# Patient Record
Sex: FEMALE | Race: WHITE | Employment: FULL TIME | URBAN - METROPOLITAN AREA
[De-identification: names, ages, dates, MRNs, and addresses within clinical notes are randomized per-mention and may not be internally consistent; named-entity substitution may affect disease eponyms.]

---

## 2017-06-23 ENCOUNTER — HOSPITAL ENCOUNTER (OUTPATIENT)
Dept: LAB | Age: 32
Discharge: HOME | End: 2017-06-23

## 2017-06-23 ENCOUNTER — OFFICE VISIT (OUTPATIENT)
Dept: SURGERY | Age: 32
End: 2017-06-23

## 2017-06-23 VITALS
BODY MASS INDEX: 24.92 KG/M2 | RESPIRATION RATE: 20 BRPM | HEART RATE: 64 BPM | WEIGHT: 146 LBS | TEMPERATURE: 96.6 F | HEIGHT: 64 IN | DIASTOLIC BLOOD PRESSURE: 86 MMHG | OXYGEN SATURATION: 98 % | SYSTOLIC BLOOD PRESSURE: 128 MMHG

## 2017-06-23 DIAGNOSIS — R22.31 AXILLARY MASS, RIGHT: Primary | ICD-10-CM

## 2017-06-23 PROCEDURE — 88185 FLOWCYTOMETRY/TC ADD-ON: CPT | Performed by: SURGERY

## 2017-06-23 PROCEDURE — 88342 IMHCHEM/IMCYTCHM 1ST ANTB: CPT | Performed by: SURGERY

## 2017-06-23 PROCEDURE — 88312 SPECIAL STAINS GROUP 1: CPT | Performed by: SURGERY

## 2017-06-23 PROCEDURE — 88184 FLOWCYTOMETRY/ TC 1 MARKER: CPT | Performed by: SURGERY

## 2017-06-23 PROCEDURE — 88360 TUMOR IMMUNOHISTOCHEM/MANUAL: CPT | Performed by: SURGERY

## 2017-06-23 PROCEDURE — 88365 INSITU HYBRIDIZATION (FISH): CPT | Performed by: SURGERY

## 2017-06-23 PROCEDURE — 88305 TISSUE EXAM BY PATHOLOGIST: CPT | Performed by: SURGERY

## 2017-06-23 NOTE — MR AVS SNAPSHOT
Visit Information Date & Time Provider Department Dept. Phone Encounter #  
 6/23/2017 11:20 AM Agnes Shoemaker MD [facility] [address] [facility] [phone] [id number] Your Appointments 6/28/2017  1:00 PM  
New Patient with Thee Wolef MD  
[address] ([facility]) Appt Note: NP golf ball size mass under rt arm referring Dr Middleton Side will have all office notes faxed [address] [facility] [address] [facility] [phone]  
[facility] [address] [phone]  
  
    
 7/10/2017 11:40 AM  
ESTABLISHED PATIENT with Agnes Shoemaker MD  
[facility] [address] Anuel Gamboa ([facility]) Appt Note: [address], [address] [facility], [address] [phone]  
[address] [facility], [address] [facility] [address] [phone] Upcoming Health Maintenance Date Due DTaP/Tdap/Td series (1 - Tdap) 7/30/2008 PAP AKA CERVICAL CYTOLOGY 7/30/2008 INFLUENZA AGE 9 TO ADULT 8/1/2017 Allergies as of 6/23/2017  Review Complete On: 6/23/2017 By: Agnes Shoemaker MD  
  
 Severity Noted Reaction Type Reactions Amoxicillin  09/15/2015    Rash, Swelling Sulfa (Sulfonamide Antibiotics)  09/15/2015    Rash, Swelling Current Immunizations  Never Reviewed No immunizations on file. Not reviewed this visit You Were Diagnosed With   
  
 Codes Comments Axillary mass, right    -  Primary ICD-10-CM: R22.31 
ICD-9-CM: 782. 2 Vitals BP Pulse Temp Resp Height(growth percentile) Weight(growth percentile) 128/86 (BP 1 Location: Right arm, BP Patient Position: Sitting) 64 96.6 °F (35.9 °C) (Oral) 20 5' 4\" (1.626 m) 146 lb (66.2 kg) LMP SpO2 BMI OB Status Smoking Status 06/09/2017 98% 25.06 kg/m2 Having regular periods Never Smoker Vitals History BMI and BSA Data Body Mass Index Body Surface Area 25.06 kg/m 2 1.73 m 2 Preferred Pharmacy Pharmacy Name Phone [facility] [address], [address] [phone] Your Updated Medication List  
  
   
This list is accurate as of: 6/23/17  1:31 PM.  Always use your most recent med list.  
  
  
  
  
 aspirin, buffered 81 mg Tab Take  by mouth.  
  
 triamcinolone acetonide 0.1 % ointment Commonly known as:  KENALOG Apply  to affected area two (2) times a day. use thin layer We Performed the Following CBC WITH AUTOMATED DIFF [85152 CPT(R)] Introducing [facility]! Meggan Peterson introduces [employer] patient portal. Now you can access parts of your medical record, email your doctor's office, and request medication refills online. 1. In your internet browser, go to [email or web address] 2. Click on the First Time User? Click Here link in the Sign In box. You will see the New Member Sign Up page. 3. Enter your [employer] Access Code exactly as it appears below. You will not need to use this code after youve completed the sign-up process. If you do not sign up before the expiration date, you must request a new code. · [employer] Access Code: [email or web address] Expires: 9/21/2017  1:30 PM 
 
4. Enter the last four digits of your Social Security Number (xxxx) and Date of Birth (mm/dd/yyyy) as indicated and click Submit. You will be taken to the next sign-up page. 5. Create a HomeSpacet ID. This will be your [employer] login ID and cannot be changed, so think of one that is secure and easy to remember. 6. Create a [employer] password. You can change your password at any time. 7. Enter your Password Reset Question and Answer. This can be used at a later time if you forget your password. 8. Enter your e-mail address. You will receive e-mail notification when new information is available in [address] 9. Click Sign Up. You can now view and download portions of your medical record. 10. Click the Download Summary menu link to download a portable copy of your medical information. If you have questions, please visit the Frequently Asked Questions section of the Ark[employer] website. Remember, [employer] is NOT to be used for urgent needs. For medical emergencies, dial 911. Now available from your iPhone and Android! Please provide this summary of care documentation to your next provider. Your primary care clinician is listed as [address]. If you have any questions after today's visit, please call [phone].

## 2017-06-23 NOTE — PROGRESS NOTES
Limited Ultrasound of the breast/axilla    Procedure:  Ultrasound of the right axilla  Indication:  Right axillary mass at    Surgeon:  Summer Richardson MD FACS  Procedure:  Utilizing a [employer] Nemio 20 high frequency linear array transducer the right axilla and axillary tail of the breast was scanned and images obtained  Findings:  Multiple enlarged lymph nodes with the largest measuring 2.7 x 1.8 x 3.1 cm hypoechoic with central enlarged hilum  Impression:  Enlarged lymph nodes suspicious for malignancy  Recommend:  Biopsy for histology    Summer Richardson MD FACS

## 2017-06-23 NOTE — PROGRESS NOTES
[facility] - [address] [address]  OFFICE PROCEDURE PROGRESS NOTE        Chart reviewed for the following:   Bonny Pedraza LPN, have reviewed the History, Physical and updated the Allergic reactions for [address] performed immediately prior to start of procedure:   Bonny Pedraza LPN, have performed the following reviews on [employer] prior to the start of the procedure:            * Patient was identified by name and date of birth   * Agreement on procedure being performed was verified  * Risks and Benefits explained to the patient  * Procedure site verified and marked as necessary  * Patient was positioned for comfort  * Consent was signed and verified     Time: 1:00      Date of procedure: 6/23/2017    Procedure performed by:  Soheila Anderson MD    Provider assisted by: Crystal Maldonado LPN    Patient assisted by: spouse    How tolerated by patient: tolerated the procedure well with no complications    Post Procedural Pain Scale: 0 - No Hurt    Comments: none

## 2017-06-23 NOTE — PROGRESS NOTES
HISTORY OF PRESENT ILLNESS  Malika Bennett is a 36 y.o. female who comes in for consultation by Kim White MD for a lump under her arm  HPI  She noted a right axillary mass about two weeks ago. She is unsure if it could have been there longer. She trauma, skin changes, breast lumps, nipple changes or drainage, unexplained weight loss, fatigue, fever, chills or sweats. 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 She had menarche at 14. Her PGM had breast cancer. Past Medical History:   Diagnosis Date    Anticardiolipin antibody syndrome ([facility] [address]     no birth control, takes ASA daily     . History reviewed. No pertinent surgical history. Family History   Problem Relation Age of Onset    Cancer Father      skin     Social History   Substance Use Topics    Smoking status: Never Smoker    Smokeless tobacco: Never Used    Alcohol use 0.0 oz/week     0 Standard drinks or equivalent per week      Comment: 3x/week 1-2 drinks     Current Outpatient Prescriptions   Medication Sig    Aspirin, Buffered 81 mg tab Take  by mouth.  triamcinolone acetonide (KENALOG) 0.1 % ointment Apply  to affected area two (2) times a day. use thin layer     No current facility-administered medications for this visit. Allergies   Allergen Reactions    Amoxicillin Rash and Swelling    Sulfa (Sulfonamide Antibiotics) Rash and Swelling       Review of Systems   Constitutional: Negative for chills, diaphoresis, fever, malaise/fatigue and weight loss. HENT: Negative for congestion, ear pain and sore throat. Eyes: Negative for blurred vision and pain. Respiratory: Negative for cough, hemoptysis, sputum production, shortness of breath, wheezing and stridor. Cardiovascular: Negative for chest pain, palpitations, orthopnea, claudication, leg swelling and PND. Gastrointestinal: Negative for abdominal pain, blood in stool, constipation, diarrhea, heartburn, melena, nausea and vomiting. Genitourinary: Negative for dysuria, flank pain, frequency, hematuria and urgency. Musculoskeletal: Negative for back pain, joint pain, myalgias and neck pain. Skin: Negative for itching and rash. Neurological: Negative for dizziness, tremors, focal weakness, seizures, weakness and headaches. Endo/Heme/Allergies: Negative for polydipsia. Psychiatric/Behavioral: Negative for depression and memory loss. The patient is not nervous/anxious. Visit Vitals    /86 (BP 1 Location: Right arm, BP Patient Position: Sitting)    Pulse 64    Temp 96.6 °F (35.9 °C) (Oral)    Resp 20    Ht 5' 4\" (1.626 m)    Wt 66.2 kg (146 lb)    LMP 06/09/2017    SpO2 98%    BMI 25.06 kg/m2       Physical Exam   Constitutional: She is oriented to person, place, and time. She appears well-developed and well-nourished. No distress. HENT:   Head: Normocephalic and atraumatic. Mouth/Throat: Oropharynx is clear and moist. No oropharyngeal exudate. Eyes: Conjunctivae and EOM are normal. Pupils are equal, round, and reactive to light. No scleral icterus. Neck: Normal range of motion. Neck supple. No JVD present. No tracheal deviation present. No thyromegaly present. Cardiovascular: Normal rate and regular rhythm. Exam reveals no gallop and no friction rub. No murmur heard. Pulmonary/Chest: Effort normal and breath sounds normal. No respiratory distress. She has no wheezes. She has no rales. Abdominal: Soft. Bowel sounds are normal. She exhibits no distension and no mass. There is no tenderness. There is no rebound and no guarding. Musculoskeletal: Normal range of motion. She exhibits no edema. Lymphadenopathy:     She has no cervical adenopathy. She has axillary adenopathy. Right axillary: Pectoral (3 cm mobile axillary mass) adenopathy present. Left axillary: No pectoral and no lateral adenopathy present. Right: No inguinal and no supraclavicular adenopathy present. Left: No inguinal and no supraclavicular adenopathy present. Neurological: She is alert and oriented to person, place, and time. No cranial nerve deficit. Skin: Skin is warm and dry. No rash noted. She is not diaphoretic. No erythema. No pallor. Psychiatric: She has a normal mood and affect. Her behavior is normal. Judgment and thought content normal.       ASSESSMENT and PLAN  1. Right axillary mass ? ?lymph node. I explained to her about the anatomy and pathophysiology of the process and options for observation vs further work up and biopsy. She desires further work up.   We did an US and core biopsy in the office today  Send for path and flow cytometry  Check CBC with diff  RTC 1-2 weeks  Everet Riedel, MD FACS

## 2017-06-23 NOTE — PROGRESS NOTES
Procedure Note    Pre Procedure Diagnosis:  Right axillary mass/lymphadenopathy  Post Procedure Diagnosis:  Right axillary mass/lymphadenopathy  Procedure:  1. Ultrasound guided core biopsy of right axillary mass/LN                        Surgeon:   Josh Law MD FACS  Local 10 ml 1% lidocaine with epi  EBL minimal  SPECIMEN:   Right axillary mass/lymph node    Procedure:  After informed consent the right axilla was prepped with betadine. After a time out, using ultrasound guidance, local anesthesia was injected into the dermis and subcutaneous tissues adjacent to the mass noted on ultrasound. A small stab incision was made and using a 14 gauge Achieve core biopsy device and ultrasound guidance 4 cores were taken from a inferior to superior approach. Pictures were taken to document this procedure. Some tissue was placed in RPMI for flow cytometry. Pressure was held and then a sterile dressing was applied. The patient tolerated the procedure well.       Signed  Josh Law MD FACS

## 2017-06-24 LAB
BASOPHILS # BLD AUTO: 0 X10E3/UL (ref 0–0.2)
BASOPHILS NFR BLD AUTO: 0 %
EOSINOPHIL # BLD AUTO: 0.1 X10E3/UL (ref 0–0.4)
EOSINOPHIL NFR BLD AUTO: 1 %
ERYTHROCYTE [DISTWIDTH] IN BLOOD BY AUTOMATED COUNT: 15.5 % (ref 12.3–15.4)
HCT VFR BLD AUTO: 39.3 % (ref 34–46.6)
HGB BLD-MCNC: 12.7 G/DL (ref 11.1–15.9)
IMM GRANULOCYTES # BLD: 0 X10E3/UL (ref 0–0.1)
IMM GRANULOCYTES NFR BLD: 0 %
LYMPHOCYTES # BLD AUTO: 2.2 X10E3/UL (ref 0.7–3.1)
LYMPHOCYTES NFR BLD AUTO: 26 %
MCH RBC QN AUTO: 26.2 PG (ref 26.6–33)
MCHC RBC AUTO-ENTMCNC: 32.3 G/DL (ref 31.5–35.7)
MCV RBC AUTO: 81 FL (ref 79–97)
MONOCYTES # BLD AUTO: 0.7 X10E3/UL (ref 0.1–0.9)
MONOCYTES NFR BLD AUTO: 9 %
NEUTROPHILS # BLD AUTO: 5.5 X10E3/UL (ref 1.4–7)
NEUTROPHILS NFR BLD AUTO: 64 %
PLATELET # BLD AUTO: 289 X10E3/UL (ref 150–379)
RBC # BLD AUTO: 4.85 X10E6/UL (ref 3.77–5.28)
WBC # BLD AUTO: 8.6 X10E3/UL (ref 3.4–10.8)

## 2017-06-28 ENCOUNTER — TELEPHONE (OUTPATIENT)
Dept: SURGERY | Age: 32
End: 2017-06-28

## 2017-06-28 NOTE — TELEPHONE ENCOUNTER
FINAL PATHOLOGIC DIAGNOSIS   Right axillary mass, needle core biopsy:   Mixed B and T lymphocytes with focal follicular hyperplasia. See comment. Comment   The needle core biopsy reveals numerous small lymphocytes with focal follicular hyperplasia. Scattered immunoblasts are seen but no convincing HRS or LP cells are identified. CD20 and PAX5 immunohistochemistry stains B-cell areas including germinal centers. The germinal centers are highlighted by CD10, BCL6 and Ki-67 without coexpression of BCL2. GZ43 reveals follicular dendritic cell meshwork. CD3, CD5 and CD43 stain T cell areas. Scattered MUM1 positive lymphocytes are present.  stains scattered plasma cells. The plasma cells are polytypic for kappa and lambda by in situ hybridization. CD30, CD15 and MARIA GUADALUPE are pending. Flow cytometric analysis was performed and interpreted at [facility] and reportedly reveals   Relative B-cell predominance without evidence of monoclonality detected; T cells with no loss of T-cell antigens by Brianna Knapp M.D. In summary, there is no evidence of lymphoma in this needle core biopsy. Incisional/excisional biopsy of the lymph node may be considered if clinical concern persists.      Spoke with the patient      RTC 2-3 weeks      Inessa Fine MD FACS

## 2017-07-10 ENCOUNTER — OFFICE VISIT (OUTPATIENT)
Dept: SURGERY | Age: 32
End: 2017-07-10

## 2017-07-10 VITALS
WEIGHT: 146 LBS | HEART RATE: 65 BPM | OXYGEN SATURATION: 100 % | HEIGHT: 64 IN | SYSTOLIC BLOOD PRESSURE: 133 MMHG | BODY MASS INDEX: 24.92 KG/M2 | DIASTOLIC BLOOD PRESSURE: 87 MMHG

## 2017-07-10 DIAGNOSIS — R59.0 LYMPHADENOPATHY, AXILLARY: ICD-10-CM

## 2017-07-10 DIAGNOSIS — R59.0 LYMPHADENOPATHY, AXILLARY: Primary | ICD-10-CM

## 2017-07-10 NOTE — PROGRESS NOTES
HISTORY OF PRESENT ILLNESS  Malika Merritt is a 36 y.o. female who comes in for consultation by Rafaela Rice MD for a lump under her arm  Follow-up   Pertinent negatives include no chest pain, no abdominal pain, no headaches and no shortness of breath. She noted a right axillary mass about two weeks ago. She is unsure if it could have been there longer. She trauma, skin changes, breast lumps, nipple changes or drainage, unexplained weight loss, fatigue, fever, chills or sweats. 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 She had menarche at 14. Her PGM had breast cancer. I did an US core bx and sent it for flow cytometry on 6/23/2017 which came back as a mixed B and T lymphocytes with focal follicular hyperplasia (EBV, AFB, cat scratch, and fungus stains were negative). She does report a slight rash on the arm which is slowly resolving. Past Medical History:   Diagnosis Date    Anticardiolipin antibody syndrome ([facility] [address]     no birth control, takes ASA daily     . Past Surgical History:   Procedure Laterality Date    HX OTHER SURGICAL  06/28/2017    right axiliary needle core bx     Family History   Problem Relation Age of Onset    Cancer Father      skin     Social History   Substance Use Topics    Smoking status: Never Smoker    Smokeless tobacco: Never Used    Alcohol use 0.0 oz/week     0 Standard drinks or equivalent per week      Comment: 3x/week 1-2 drinks     Current Outpatient Prescriptions   Medication Sig    Aspirin, Buffered 81 mg tab Take  by mouth.  triamcinolone acetonide (KENALOG) 0.1 % ointment Apply  to affected area two (2) times a day. use thin layer     No current facility-administered medications for this visit.       Allergies   Allergen Reactions    Amoxicillin Rash and Swelling    Sulfa (Sulfonamide Antibiotics) Rash and Swelling       Review of Systems   Constitutional: Negative for chills, diaphoresis, fever, malaise/fatigue and weight loss. HENT: Negative for congestion, ear pain and sore throat. Eyes: Negative for blurred vision and pain. Respiratory: Negative for cough, hemoptysis, sputum production, shortness of breath, wheezing and stridor. Cardiovascular: Negative for chest pain, palpitations, orthopnea, claudication, leg swelling and PND. Gastrointestinal: Negative for abdominal pain, blood in stool, constipation, diarrhea, heartburn, melena, nausea and vomiting. Genitourinary: Negative for dysuria, flank pain, frequency, hematuria and urgency. Musculoskeletal: Negative for back pain, joint pain, myalgias and neck pain. Skin: Negative for itching and rash. Neurological: Negative for dizziness, tremors, focal weakness, seizures, weakness and headaches. Endo/Heme/Allergies: Negative for polydipsia. Psychiatric/Behavioral: Negative for depression and memory loss. The patient is not nervous/anxious. Visit Vitals    LMP 06/09/2017       Physical Exam   Constitutional: She is oriented to person, place, and time. She appears well-developed and well-nourished. No distress. HENT:   Head: Normocephalic and atraumatic. Mouth/Throat: Oropharynx is clear and moist. No oropharyngeal exudate. Eyes: Conjunctivae and EOM are normal. Pupils are equal, round, and reactive to light. No scleral icterus. Neck: Normal range of motion. Neck supple. No JVD present. No tracheal deviation present. No thyromegaly present. Cardiovascular: Normal rate and regular rhythm. Exam reveals no gallop and no friction rub. No murmur heard. Pulmonary/Chest: Effort normal and breath sounds normal. No respiratory distress. She has no wheezes. She has no rales. Abdominal: Soft. Bowel sounds are normal. She exhibits no distension and no mass. There is no tenderness. There is no rebound and no guarding. Musculoskeletal: Normal range of motion. She exhibits no edema. Lymphadenopathy:     She has no cervical adenopathy.      She has axillary adenopathy. Right axillary: Pectoral (3 cm mobile axillary mass) adenopathy present. Left axillary: No pectoral and no lateral adenopathy present. Right: No inguinal and no supraclavicular adenopathy present. Left: No inguinal and no supraclavicular adenopathy present. Neurological: She is alert and oriented to person, place, and time. No cranial nerve deficit. Skin: Skin is warm and dry. No rash noted. She is not diaphoretic. No erythema. No pallor. Psychiatric: She has a normal mood and affect. Her behavior is normal. Judgment and thought content normal.       ASSESSMENT and PLAN  1. Right axillary lymphadenopathy. Path demonstrated focal follicular hyperplasia. I explained to her about the anatomy and pathophysiology of the process and unclear etiology. She has had tick  Bites.   After discussion with a ID and hematology physicians will check  HIV-1  Quantiferon gold  RPR  ricketsia and lymes titers  Check CBC with diff    Will call with results  If all negative would likely move toward formal ID and hematology consultations and likely lymph node excisional biopsy  RTC 1-2 weeks  Olive Clemens MD FACS

## 2017-07-10 NOTE — MR AVS SNAPSHOT
Visit Information Date & Time Provider Department Dept. Phone Encounter #  
 7/10/2017 11:40 AM Fransisca Lara MD [facility] Pending sale to [facility] [address] [phone] [id number] Upcoming Health Maintenance Date Due DTaP/Tdap/Td series (1 - Tdap) 7/30/2008 PAP AKA CERVICAL CYTOLOGY 7/30/2008 INFLUENZA AGE 9 TO ADULT 8/1/2017 Allergies as of 7/10/2017  Review Complete On: 7/10/2017 By: Fransisca Lara MD  
  
 Severity Noted Reaction Type Reactions Amoxicillin  09/15/2015    Rash, Swelling Sulfa (Sulfonamide Antibiotics)  09/15/2015    Rash, Swelling Current Immunizations  Never Reviewed No immunizations on file. Not reviewed this visit You Were Diagnosed With   
  
 Codes Comments Lymphadenopathy, axillary    -  Primary ICD-10-CM: R59.0 ICD-9-CM: 674. 6 Vitals BP Pulse Height(growth percentile) Weight(growth percentile) LMP SpO2  
 133/87 (BP 1 Location: Right arm, BP Patient Position: Sitting) 65 5' 4\" (1.626 m) 146 lb (66.2 kg) 06/09/2017 100% BMI OB Status Smoking Status 25.06 kg/m2 Having regular periods Never Smoker BMI and BSA Data Body Mass Index Body Surface Area 25.06 kg/m 2 1.73 m 2 Preferred Pharmacy Pharmacy Name Phone [phone], [address] [phone] Your Updated Medication List  
  
   
This list is accurate as of: 7/10/17 11:59 PM.  Always use your most recent med list.  
  
  
  
  
 aspirin, buffered 81 mg Tab Take  by mouth.  
  
 triamcinolone acetonide 0.1 % ointment Commonly known as:  KENALOG Apply  to affected area two (2) times a day. use thin layer We Performed the Following CBC WITH AUTOMATED DIFF [60142 CPT(R)] LYME AB, IGM, WITH REFLEX WBLOT [[id number] Custom] QUANTIFERON TB GOLD [[id number] Custom] R RICKETTSII AB IGG W/REFL [[id number] Custom] To-Do List   
 07/10/2017 Lab:  HIV 1/2 AG/AB, 4TH GENERATION,W RFLX CONFIRM   
  
 07/10/2017 Lab:  RPR W/REFLEX TITER AND TREPONEMA ABS Introducing [facility] SERVICES! Dear Angelito Gabriel: Thank you for requesting a [employer] account. Our records indicate that you already have an active [email or web address] account. You can access your account anytime at [email or web address] Did you know that you can access your hospital and ER discharge instructions at any time in Camera[employer]? You can also review all of your test results from your hospital stay or ER visit. Additional Information If you have questions, please visit the Frequently Asked Questions section of the [employer] website at [email or web address] Remember, [employer] is NOT to be used for urgent needs. For medical emergencies, dial 911. Now available from your iPhone and Android! Please provide this summary of care documentation to your next provider. Lyme Disease Testing Disclaimer:   
 § 00.5-6522.9. (Expires July 1, 2018) Lyme disease testing information disclosure. A. Every licensee or his in-office designee who orders a laboratory test for the presence of Lyme disease shall provide to the patient or his legal representative the following written information: \"ACCORDING TO THE CENTERS FOR DISEASE CONTROL AND PREVENTION, AS OF 2011 LYME DISEASE IS THE SIXTH FASTEST GROWING DISEASE IN THE [place]. YOUR HEALTH CARE PROVIDER HAS ORDERED A LABORATORY TEST FOR THE PRESENCE OF LYME DISEASE FOR YOU. CURRENT LABORATORY TESTING FOR LYME DISEASE CAN BE PROBLEMATIC AND STANDARD LABORATORY TESTS OFTEN RESULT IN FALSE NEGATIVE AND FALSE POSITIVE RESULTS, AND IF DONE TOO EARLY, YOU MAY NOT HAVE PRODUCED ENOUGH ANTIBODIES TO BE CONSIDERED POSITIVE BECAUSE YOUR IMMUNE RESPONSE REQUIRES TIME TO DEVELOP ANTIBODIES.  IF YOU ARE TESTED FOR LYME DISEASE, AND THE RESULTS ARE NEGATIVE, THIS DOES NOT NECESSARILY MEAN YOU DO NOT HAVE LYME DISEASE. IF YOU CONTINUE TO EXPERIENCE SYMPTOMS, YOU SHOULD CONTACT YOUR HEALTH CARE PROVIDER AND INQUIRE ABOUT THE APPROPRIATENESS OF RETESTING OR ADDITIONAL TREATMENT. \"  
B. Licensees shall be immune from civil liability for the provision of the written information required by this section absent gross negligence or willful misconduct. Your primary care clinician is listed as [address]. If you have any questions after today's visit, please call [phone].

## 2017-07-13 ENCOUNTER — TELEPHONE (OUTPATIENT)
Dept: SURGERY | Age: 32
End: 2017-07-13

## 2017-07-13 LAB
ANNOTATION COMMENT IMP: NORMAL
B BURGDOR IGG PATRN SER IB-IMP: POSITIVE
B BURGDOR IGM PATRN SER IB-IMP: POSITIVE
B BURGDOR IGM SER IA-ACNC: 2.68 INDEX (ref 0–0.79)
B BURGDOR18KD IGG SER QL IB: PRESENT
B BURGDOR23KD IGG SER QL IB: PRESENT
B BURGDOR23KD IGM SER QL IB: PRESENT
B BURGDOR28KD IGG SER QL IB: PRESENT
B BURGDOR30KD IGG SER QL IB: PRESENT
B BURGDOR39KD IGG SER QL IB: PRESENT
B BURGDOR39KD IGM SER QL IB: ABNORMAL
B BURGDOR41KD IGG SER QL IB: PRESENT
B BURGDOR41KD IGM SER QL IB: PRESENT
B BURGDOR45KD IGG SER QL IB: PRESENT
B BURGDOR58KD IGG SER QL IB: PRESENT
B BURGDOR66KD IGG SER QL IB: PRESENT
B BURGDOR93KD IGG SER QL IB: PRESENT
BASOPHILS # BLD AUTO: 0.1 X10E3/UL (ref 0–0.2)
BASOPHILS NFR BLD AUTO: 1 %
EOSINOPHIL # BLD AUTO: 0.1 X10E3/UL (ref 0–0.4)
EOSINOPHIL NFR BLD AUTO: 2 %
ERYTHROCYTE [DISTWIDTH] IN BLOOD BY AUTOMATED COUNT: 15.5 % (ref 12.3–15.4)
GAMMA INTERFERON BACKGROUND BLD IA-ACNC: 0.02 IU/ML
HCT VFR BLD AUTO: 37.1 % (ref 34–46.6)
HGB BLD-MCNC: 12.1 G/DL (ref 11.1–15.9)
HIV 1+2 AB+HIV1 P24 AG SERPL QL IA: NON REACTIVE
IMM GRANULOCYTES # BLD: 0 X10E3/UL (ref 0–0.1)
IMM GRANULOCYTES NFR BLD: 0 %
LYMPHOCYTES # BLD AUTO: 1.7 X10E3/UL (ref 0.7–3.1)
LYMPHOCYTES NFR BLD AUTO: 24 %
M TB IFN-G BLD-IMP: NEGATIVE
M TB IFN-G CD4+ BCKGRND COR BLD-ACNC: 0 IU/ML
M TB IFN-G CD4+ T-CELLS BLD-ACNC: 0.02 IU/ML
MCH RBC QN AUTO: 26.9 PG (ref 26.6–33)
MCHC RBC AUTO-ENTMCNC: 32.6 G/DL (ref 31.5–35.7)
MCV RBC AUTO: 82 FL (ref 79–97)
MITOGEN IGNF BLD-ACNC: 9.88 IU/ML
MONOCYTES # BLD AUTO: 0.7 X10E3/UL (ref 0.1–0.9)
MONOCYTES NFR BLD AUTO: 10 %
NEUTROPHILS # BLD AUTO: 4.6 X10E3/UL (ref 1.4–7)
NEUTROPHILS NFR BLD AUTO: 63 %
PLATELET # BLD AUTO: 322 X10E3/UL (ref 150–379)
QUANTIFERON INCUBATION: NORMAL
R RICKETTSI IGG SER QL IA: NEGATIVE
RBC # BLD AUTO: 4.5 X10E6/UL (ref 3.77–5.28)
RPR SER QL: NON REACTIVE
SERVICE CMNT-IMP: NORMAL
WBC # BLD AUTO: 7.2 X10E3/UL (ref 3.4–10.8)

## 2017-07-13 RX ORDER — DOXYCYCLINE 100 MG/1
100 TABLET ORAL 2 TIMES DAILY
Qty: 42 TAB | Refills: 0 | Status: SHIPPED | OUTPATIENT
Start: 2017-07-13 | End: 2017-07-24 | Stop reason: SDUPTHER

## 2017-07-13 NOTE — TELEPHONE ENCOUNTER
Labs suggest Lyme's disease  Spoke with DR Norm Montejo. Favors starting 3 week course of doxycycline 100 mg BID. Rx sent to pharmacy    Dr Norm Montejo wants her to f/u with him on 7/24  She needs to call [phone] and let his  Angeles Jamil) know that this is what he wanted.     Left message for patient    Leatha Morrison MD FACS

## 2017-07-14 ENCOUNTER — TELEPHONE (OUTPATIENT)
Dept: SURGERY | Age: 32
End: 2017-07-14

## 2017-07-14 DIAGNOSIS — R59.0 LYMPHADENOPATHY, AXILLARY: Primary | ICD-10-CM

## 2017-07-14 NOTE — TELEPHONE ENCOUNTER
Patient returned call. Patient states she will be in and out of meetings during the day. But will try to answer call. The best time to reach the patient will be around noon.

## 2017-07-19 LAB
B HENSELAE IGG TITR SER IF: NEGATIVE TITER
B HENSELAE IGM TITR SER IF: NEGATIVE TITER
B QUINTANA IGG TITR SER IF: NEGATIVE TITER
B QUINTANA IGM TITR SER IF: NEGATIVE TITER

## 2017-07-24 ENCOUNTER — OFFICE VISIT (OUTPATIENT)
Dept: INTERNAL MEDICINE CLINIC | Age: 32
End: 2017-07-24

## 2017-07-24 VITALS
RESPIRATION RATE: 12 BRPM | BODY MASS INDEX: 24.59 KG/M2 | SYSTOLIC BLOOD PRESSURE: 124 MMHG | HEART RATE: 64 BPM | OXYGEN SATURATION: 100 % | WEIGHT: 144 LBS | HEIGHT: 64 IN | DIASTOLIC BLOOD PRESSURE: 85 MMHG | TEMPERATURE: 97.3 F

## 2017-07-24 DIAGNOSIS — A69.20 LYME BORRELIOSIS: Primary | ICD-10-CM

## 2017-07-24 RX ORDER — ASPIRIN 81 MG/1
TABLET ORAL DAILY
COMMUNITY
End: 2019-01-17

## 2017-07-24 RX ORDER — DOXYCYCLINE 100 MG/1
100 TABLET ORAL 2 TIMES DAILY
Qty: 20 TAB | Refills: 1 | Status: SHIPPED | OUTPATIENT
Start: 2017-07-24 | End: 2017-08-03

## 2017-07-24 NOTE — PROGRESS NOTES
I have been asked to see this patient by  for advice/opinion related to evaluating unilateral axillary adenopathy. The patient was interviewed and examined. All available records were reviewed in detail. Her PCP is Dr. Jack Echeverria. She is a 37 yo MWF from [place], [place], working full time and in good health. Her only regular med is daily ASA for a diagnosis of anticardiolipin syndrome (asymptomatic). She moved with her  to a new home around 4 mos ago which ab[place] area. She also has an indoor-outdoor dog. Around THE [facility] Day she developed tender right axillary adenopathy without associated fever, chills, arthritis, HA, or sweats. The only complaint was fatigue. She saw her PCP who noted a rash on her right triceps area. This started as a reddened area that eventually expanded to around 10x10 cm with central clearing. She also had several embedded ticks found. She was referred to Dr Edgardo Macdonald, who performed an US guided core biopsy of the lymph node on 6/23. Histopathology showed only nonspecific follicular hyperplasia with negative special stains for AFB, Fungi, and bartonella. He discussed the case with me and additional serologies were done. RPR, HIV screen, bartonella Abs, Quantiferon assay, and Rickettsial Abs were all negative. CBC was unremarkable. Lyme serologies were + for both significant IgG and IgM antibodies. Similar serologies done in September or 2015 were negative. She was started on doxycycline 100 mg BID about 11 days ago. The lymph node has decreased by half in size, and is nontender. The rash has resolved prior to Tx. Allergy history, medication list, past medical history, and social history were all reviewed. No unusual travel history, raw food ingestion, infectious contacts, or other animal or insect exposures. No implanted devices. She is allergic to sulfa and amoxicillin, both producing rashes. ROS  - fever, sweats, or chills.   -weight loss.  -fatigue/malasie. +Rash., now gone. +Swollen glands.  -Oral lesions.  -Photophobia. -Jaundice.   - SOB. - cough. - abdominal pain or tenderness.  - Nausea or vomiting.  - Diarrhea. No joint inflammation or swelling. No headache or AMS. ROS otherwise negative with greater than 10 systems reviewed. EXAM:  General: Afebrile and not toxic. No exanthem or enanthem. Alert and oriented x3. HEENT: EOMI. Anicteric. Oral mucosa normal. Conjunctivae normal. Neck supple. Chest: Lungs clear to A&P. Regular rhythm without murmurs. Abdomen: Soft without organomegaly, tenderness, or masses. Nondistended. Bowel sounds normal.   Musculoskeletal: No joint inflammation or effusions. No edema. l.  Neurologic: Nonfocal exam.  Lymph Nodes: There is a semifixed, nontender right axillary node of around 1-2 cm in diameter. No other lymphadenopathy was found. Skin: No current rash. ASSESSMENT AND PLAN    Early disseminated Lyme infection. The history, characteristic rash, tick exposure, and borrelia antibody conversion all indicate early infection. No other manifestations of borreliosis found. The treatment is at least 21 days of oral doxycycline. I prefer 28 days total, and additional doxycycline was ordered. I suspect the lymphadenopathy will disappear over time. The chances of late sequelae are small but not impossible. She understands this and will watch for signs and symptoms of late Lyme disease as time goes on. Her antibodies for Lyme will probably be + for life and cannot be used to gauge success of Tx. I answered all of her questions, and will see her back PRN only. Follow up with PCP as needed. No further work up is warranted at this time.

## 2017-07-24 NOTE — MR AVS SNAPSHOT
Visit Information Date & Time Provider Department Dept. Phone Encounter #  
 7/24/2017  9:30 AM Elie Block, [address] [phone] [id number] Follow-up Instructions Return if symptoms worsen or fail to improve. Upcoming Health Maintenance Date Due DTaP/Tdap/Td series (1 - Tdap) 7/30/2008 PAP AKA CERVICAL CYTOLOGY 7/30/2008 INFLUENZA AGE 9 TO ADULT 8/1/2017 Allergies as of 7/24/2017  Review Complete On: 7/24/2017 By: Elie Block MD  
  
 Severity Noted Reaction Type Reactions Amoxicillin  09/15/2015    Rash, Swelling Sulfa (Sulfonamide Antibiotics)  09/15/2015    Rash, Swelling Current Immunizations  Never Reviewed No immunizations on file. Not reviewed this visit Vitals BP Pulse Temp Resp Height(growth percentile) Weight(growth percentile) 124/85 (BP 1 Location: Left arm, BP Patient Position: Sitting) 64 97.3 °F (36.3 °C) (Oral) 12 5' 4\" (1.626 m) 144 lb (65.3 kg) SpO2 BMI OB Status Smoking Status 100% 24.72 kg/m2 Having regular periods Never Smoker BMI and BSA Data Body Mass Index Body Surface Area 24.72 kg/m 2 1.72 m 2 Preferred Pharmacy Pharmacy Name Phone [id number], [address] [phone] Your Updated Medication List  
  
   
This list is accurate as of: 7/24/17 10:05 AM.  Always use your most recent med list.  
  
  
  
  
 aspirin delayed-release 81 mg tablet Take  by mouth daily. aspirin, buffered 81 mg Tab Take  by mouth. doxycycline 100 mg tablet Commonly known as:  ADOXA Take 1 Tab by mouth two (2) times a day for 10 days. triamcinolone acetonide 0.1 % ointment Commonly known as:  KENALOG Apply  to affected area two (2) times a day. use thin layer Prescriptions Sent to Pharmacy  Refills  
 doxycycline (ADOXA) 100 mg tablet 1  
 Sig: Take 1 Tab by mouth two (2) times a day for 10 days. Class: Normal  
 Pharmacy: [address] Ph #: [phone] Route: Oral  
  
Follow-up Instructions Return if symptoms worsen or fail to improve. Introducing [facility]! Dear Carmen Negron: Thank you for requesting a [employer] account. Our records indicate that you already have an active [employer] account. You can access your account anytime at [email or web address] Did you know that you can access your hospital and ER discharge instructions at any time in [employer]? You can also review all of your test results from your hospital stay or ER visit. Additional Information If you have questions, please visit the Frequently Asked Questions section of the [employer] website at [email or web address] Remember, [employer] is NOT to be used for urgent needs. For medical emergencies, dial 911. Now available from your iPhone and Android! Please provide this summary of care documentation to your next provider. Your primary care clinician is listed as [address]. If you have any questions after today's visit, please call [phone].

## 2017-07-26 ENCOUNTER — TELEPHONE (OUTPATIENT)
Dept: SURGERY | Age: 32
End: 2017-07-26

## 2018-06-04 LAB
ANTIBODY SCREEN, EXTERNAL: NEGATIVE
CHLAMYDIA, EXTERNAL: NEGATIVE
HBSAG, EXTERNAL: NEGATIVE
HIV, EXTERNAL: NON REACTIVE
N. GONORRHEA, EXTERNAL: NEGATIVE
RUBELLA, EXTERNAL: NORMAL
T. PALLIDUM, EXTERNAL: NEGATIVE
TYPE, ABO & RH, EXTERNAL: NORMAL

## 2018-12-16 ENCOUNTER — APPOINTMENT (OUTPATIENT)
Dept: CT IMAGING | Age: 33
End: 2018-12-16
Attending: EMERGENCY MEDICINE
Payer: COMMERCIAL

## 2018-12-16 ENCOUNTER — HOSPITAL ENCOUNTER (OUTPATIENT)
Age: 33
Setting detail: OBSERVATION
Discharge: HOME | End: 2018-12-17
Attending: EMERGENCY MEDICINE | Admitting: OBSTETRICS & GYNECOLOGY
Payer: COMMERCIAL

## 2018-12-16 ENCOUNTER — APPOINTMENT (OUTPATIENT)
Dept: MRI IMAGING | Age: 33
End: 2018-12-16
Attending: EMERGENCY MEDICINE
Payer: COMMERCIAL

## 2018-12-16 DIAGNOSIS — R20.2 PARESTHESIA: Primary | ICD-10-CM

## 2018-12-16 LAB
ALBUMIN SERPL-MCNC: 2.3 G/DL (ref 3.5–5)
ALBUMIN SERPL-MCNC: 2.5 G/DL (ref 3.5–5)
ALBUMIN/GLOB SERPL: 0.6 {RATIO} (ref 1.1–2.2)
ALBUMIN/GLOB SERPL: 0.7 {RATIO} (ref 1.1–2.2)
ALP SERPL-CCNC: 129 U/L (ref 45–117)
ALP SERPL-CCNC: 130 U/L (ref 45–117)
ALT SERPL-CCNC: 16 U/L (ref 12–78)
ALT SERPL-CCNC: 16 U/L (ref 12–78)
ALT SERPL-CCNC: 17 U/L (ref 12–78)
ANION GAP SERPL CALC-SCNC: 10 MMOL/L (ref 5–15)
ANION GAP SERPL CALC-SCNC: 8 MMOL/L (ref 5–15)
APPEARANCE UR: ABNORMAL
APTT PPP: 25.4 SEC (ref 22.1–32)
AST SERPL-CCNC: 14 U/L (ref 15–37)
ATRIAL RATE: 88 BPM
BACTERIA URNS QL MICRO: ABNORMAL /HPF
BASOPHILS # BLD: 0 K/UL (ref 0–0.1)
BASOPHILS NFR BLD: 0 % (ref 0–1)
BILIRUB SERPL-MCNC: 0.2 MG/DL (ref 0.2–1)
BILIRUB SERPL-MCNC: 0.2 MG/DL (ref 0.2–1)
BILIRUB UR QL: NEGATIVE
BUN SERPL-MCNC: 5 MG/DL (ref 6–20)
BUN SERPL-MCNC: 7 MG/DL (ref 6–20)
BUN/CREAT SERPL: 13 (ref 12–20)
BUN/CREAT SERPL: 9 (ref 12–20)
CALCIUM SERPL-MCNC: 8.1 MG/DL (ref 8.5–10.1)
CALCIUM SERPL-MCNC: 8.3 MG/DL (ref 8.5–10.1)
CALCULATED P AXIS, ECG09: 51 DEGREES
CALCULATED R AXIS, ECG10: 18 DEGREES
CALCULATED T AXIS, ECG11: 18 DEGREES
CHLORIDE SERPL-SCNC: 108 MMOL/L (ref 97–108)
CHLORIDE SERPL-SCNC: 109 MMOL/L (ref 97–108)
CK MB CFR SERPL CALC: NORMAL % (ref 0–2.5)
CK MB SERPL-MCNC: <1 NG/ML (ref 5–25)
CK SERPL-CCNC: 62 U/L (ref 26–192)
CO2 SERPL-SCNC: 20 MMOL/L (ref 21–32)
CO2 SERPL-SCNC: 22 MMOL/L (ref 21–32)
COLOR UR: ABNORMAL
COMMENT, HOLDF: NORMAL
CREAT SERPL-MCNC: 0.56 MG/DL (ref 0.55–1.02)
CREAT SERPL-MCNC: 0.57 MG/DL (ref 0.55–1.02)
CREAT UR-MCNC: <13 MG/DL
DIAGNOSIS, 93000: NORMAL
DIFFERENTIAL METHOD BLD: ABNORMAL
EOSINOPHIL # BLD: 0.1 K/UL (ref 0–0.4)
EOSINOPHIL NFR BLD: 1 % (ref 0–7)
EPITH CASTS URNS QL MICRO: ABNORMAL /LPF
ERYTHROCYTE [DISTWIDTH] IN BLOOD BY AUTOMATED COUNT: 18.7 % (ref 11.5–14.5)
GLOBULIN SER CALC-MCNC: 3.5 G/DL (ref 2–4)
GLOBULIN SER CALC-MCNC: 4 G/DL (ref 2–4)
GLUCOSE BLD STRIP.AUTO-MCNC: 99 MG/DL (ref 65–100)
GLUCOSE SERPL-MCNC: 89 MG/DL (ref 65–100)
GLUCOSE SERPL-MCNC: 97 MG/DL (ref 65–100)
GLUCOSE UR STRIP.AUTO-MCNC: 100 MG/DL
GRBS, EXTERNAL: NEGATIVE
HCT VFR BLD AUTO: 35.6 % (ref 35–47)
HGB BLD-MCNC: 10.8 G/DL (ref 11.5–16)
HGB UR QL STRIP: NEGATIVE
IMM GRANULOCYTES # BLD: 0.2 K/UL (ref 0–0.04)
IMM GRANULOCYTES NFR BLD AUTO: 2 % (ref 0–0.5)
INR PPP: 0.9 (ref 0.9–1.1)
KETONES UR QL STRIP.AUTO: NEGATIVE MG/DL
LDH SERPL L TO P-CCNC: 148 U/L (ref 81–246)
LEUKOCYTE ESTERASE UR QL STRIP.AUTO: NEGATIVE
LYMPHOCYTES # BLD: 1.8 K/UL (ref 0.8–3.5)
LYMPHOCYTES NFR BLD: 15 % (ref 12–49)
MCH RBC QN AUTO: 25.5 PG (ref 26–34)
MCHC RBC AUTO-ENTMCNC: 30.3 G/DL (ref 30–36.5)
MCV RBC AUTO: 84 FL (ref 80–99)
MONOCYTES # BLD: 1.1 K/UL (ref 0–1)
MONOCYTES NFR BLD: 10 % (ref 5–13)
NEUTS SEG # BLD: 8.4 K/UL (ref 1.8–8)
NEUTS SEG NFR BLD: 72 % (ref 32–75)
NITRITE UR QL STRIP.AUTO: NEGATIVE
NRBC # BLD: 0 K/UL (ref 0–0.01)
NRBC BLD-RTO: 0 PER 100 WBC
P-R INTERVAL, ECG05: 138 MS
PH UR STRIP: 7.5 [PH] (ref 5–8)
PLATELET # BLD AUTO: 218 K/UL (ref 150–400)
PMV BLD AUTO: 11.3 FL (ref 8.9–12.9)
POTASSIUM SERPL-SCNC: 3.5 MMOL/L (ref 3.5–5.1)
POTASSIUM SERPL-SCNC: 3.8 MMOL/L (ref 3.5–5.1)
PROT SERPL-MCNC: 6 G/DL (ref 6.4–8.2)
PROT SERPL-MCNC: 6.3 G/DL (ref 6.4–8.2)
PROT UR STRIP-MCNC: NEGATIVE MG/DL
PROT UR-MCNC: <5 MG/DL (ref 0–11.9)
PROT/CREAT UR-RTO: NORMAL
PROTHROMBIN TIME: 9.2 SEC (ref 9–11.1)
Q-T INTERVAL, ECG07: 364 MS
QRS DURATION, ECG06: 84 MS
QTC CALCULATION (BEZET), ECG08: 440 MS
RBC # BLD AUTO: 4.24 M/UL (ref 3.8–5.2)
RBC #/AREA URNS HPF: ABNORMAL /HPF (ref 0–5)
SAMPLES BEING HELD,HOLD: NORMAL
SERVICE CMNT-IMP: NORMAL
SODIUM SERPL-SCNC: 138 MMOL/L (ref 136–145)
SODIUM SERPL-SCNC: 139 MMOL/L (ref 136–145)
SP GR UR REFRACTOMETRY: 1 (ref 1–1.03)
THERAPEUTIC RANGE,PTTT: NORMAL SECS (ref 58–77)
TROPONIN I SERPL-MCNC: <0.05 NG/ML
UA: UC IF INDICATED,UAUC: ABNORMAL
URATE SERPL-MCNC: 3.7 MG/DL (ref 2.6–6)
UROBILINOGEN UR QL STRIP.AUTO: 0.2 EU/DL (ref 0.2–1)
VENTRICULAR RATE, ECG03: 88 BPM
WBC # BLD AUTO: 11.6 K/UL (ref 3.6–11)
WBC URNS QL MICRO: ABNORMAL /HPF (ref 0–4)

## 2018-12-16 PROCEDURE — 0042T CT PERF W CBF: CPT

## 2018-12-16 PROCEDURE — 83615 LACTATE (LD) (LDH) ENZYME: CPT

## 2018-12-16 PROCEDURE — 87086 URINE CULTURE/COLONY COUNT: CPT

## 2018-12-16 PROCEDURE — 81001 URINALYSIS AUTO W/SCOPE: CPT

## 2018-12-16 PROCEDURE — 85730 THROMBOPLASTIN TIME PARTIAL: CPT

## 2018-12-16 PROCEDURE — 82553 CREATINE MB FRACTION: CPT

## 2018-12-16 PROCEDURE — 96360 HYDRATION IV INFUSION INIT: CPT

## 2018-12-16 PROCEDURE — 93005 ELECTROCARDIOGRAM TRACING: CPT

## 2018-12-16 PROCEDURE — 82570 ASSAY OF URINE CREATININE: CPT

## 2018-12-16 PROCEDURE — 36415 COLL VENOUS BLD VENIPUNCTURE: CPT

## 2018-12-16 PROCEDURE — 70551 MRI BRAIN STEM W/O DYE: CPT

## 2018-12-16 PROCEDURE — 80053 COMPREHEN METABOLIC PANEL: CPT

## 2018-12-16 PROCEDURE — 87081 CULTURE SCREEN ONLY: CPT

## 2018-12-16 PROCEDURE — 84484 ASSAY OF TROPONIN QUANT: CPT

## 2018-12-16 PROCEDURE — 74011250637 HC RX REV CODE- 250/637

## 2018-12-16 PROCEDURE — 70498 CT ANGIOGRAPHY NECK: CPT

## 2018-12-16 PROCEDURE — 99285 EMERGENCY DEPT VISIT HI MDM: CPT

## 2018-12-16 PROCEDURE — 99218 HC RM OBSERVATION: CPT

## 2018-12-16 PROCEDURE — 74011250636 HC RX REV CODE- 250/636: Performed by: OBSTETRICS & GYNECOLOGY

## 2018-12-16 PROCEDURE — 70450 CT HEAD/BRAIN W/O DYE: CPT

## 2018-12-16 PROCEDURE — 84450 TRANSFERASE (AST) (SGOT): CPT

## 2018-12-16 PROCEDURE — 84550 ASSAY OF BLOOD/URIC ACID: CPT

## 2018-12-16 PROCEDURE — 85610 PROTHROMBIN TIME: CPT

## 2018-12-16 PROCEDURE — 82962 GLUCOSE BLOOD TEST: CPT

## 2018-12-16 PROCEDURE — 84460 ALANINE AMINO (ALT) (SGPT): CPT

## 2018-12-16 PROCEDURE — 85025 COMPLETE CBC W/AUTO DIFF WBC: CPT

## 2018-12-16 RX ORDER — SODIUM CHLORIDE 0.9 % (FLUSH) 0.9 %
5-10 SYRINGE (ML) INJECTION EVERY 8 HOURS
Status: DISCONTINUED | OUTPATIENT
Start: 2018-12-17 | End: 2018-12-17 | Stop reason: HOSPADM

## 2018-12-16 RX ORDER — GUAIFENESIN 100 MG/5ML
LIQUID (ML) ORAL
Status: COMPLETED
Start: 2018-12-16 | End: 2018-12-16

## 2018-12-16 RX ORDER — TERBUTALINE SULFATE 1 MG/ML
0.25 INJECTION SUBCUTANEOUS AS NEEDED
Status: DISCONTINUED | OUTPATIENT
Start: 2018-12-16 | End: 2018-12-17 | Stop reason: HOSPADM

## 2018-12-16 RX ORDER — LANOLIN ALCOHOL/MO/W.PET/CERES
CREAM (GRAM) TOPICAL
Status: ON HOLD | COMMUNITY
End: 2018-12-16

## 2018-12-16 RX ORDER — SODIUM CHLORIDE 0.9 % (FLUSH) 0.9 %
SYRINGE (ML) INJECTION
Status: COMPLETED
Start: 2018-12-16 | End: 2018-12-16

## 2018-12-16 RX ORDER — LANOLIN ALCOHOL/MO/W.PET/CERES
1 CREAM (GRAM) TOPICAL DAILY
Status: DISCONTINUED | OUTPATIENT
Start: 2018-12-17 | End: 2018-12-17 | Stop reason: HOSPADM

## 2018-12-16 RX ORDER — ASPIRIN 81 MG/1
81 TABLET ORAL DAILY
Status: DISCONTINUED | OUTPATIENT
Start: 2018-12-16 | End: 2018-12-17 | Stop reason: HOSPADM

## 2018-12-16 RX ADMIN — SODIUM CHLORIDE 500 ML: 900 INJECTION, SOLUTION INTRAVENOUS at 10:06

## 2018-12-16 RX ADMIN — Medication 10 ML: at 22:22

## 2018-12-16 RX ADMIN — ASPIRIN 81 MG: 81 TABLET, CHEWABLE ORAL at 16:06

## 2018-12-16 NOTE — ED TRIAGE NOTES
Triage: Patient arrives ambulatory from home with c/o left arm numbness and facial numbness since waking at 0100, reports going to bed at 2130 normal. Patient also 34 weeks pregnant and with clotting disorder. Reports sensory deficits on triage exam on LEFT side. BG 99.

## 2018-12-16 NOTE — PROGRESS NOTES
Bedside shift change report given to PABLO Sen (oncoming nurse), received report from ESPERANZA Taylor RN. Report included the following information SBAR, Intake/Output and MAR.     0930- SVE closed and soft by Dr. Corbin Mayers, GBS to be sent    1030-At this time Dr. Corbin Mayers discussed plan of care to continue with continuous monitoring for 24 hours and to be seen by M that has been following pt in am.    1526- Tranferred to Labor and Delivery room 2 from Labor and Delivery room 14. 1930-SBAR at bedside to [facility] Ayoub RN

## 2018-12-16 NOTE — H&P
Labor and Delivery Admission Note  12/16/2018    34 y.o., , female, G1 P 0 at 34.6 weeks and an  Estimated Date of Delivery: 1/21/19 by dates and US presents with contractions from the ED at 0232  Reports good fetal movement, no bleeding, and now has mild contractions. Pt initially evaluated in ED for facial and left arm numbness and ruled out for stroke bu CT, MRI and Neuro eval, also had nl EKG. She reports that sx's started at approx 0100 and are now much improved. She was diagnosed at age 25 with BABS when had same sx's while on ocp's. She was sen by hematology and thinks was evaluated for SLE at the time. She is followed by Dr. Magnolia Telles. Since arrival on L&D, she notes contractions are much less painful. She currently denies HA, visual changes, hearing loss, ST, cough, SOB, CP, abd pain, n/v/d and swelling. PNC: Blood type: O            RH: pos            Rubella: immune            SVII serology: neg             GBS status: unknown             HIV: neg             HBsAg: neg    Past Medical History:   Diagnosis Date    Abnormal Papanicolaou smear of cervix     a few years ago, follow up biopsy and now normal    Anticardiolipin antibody syndrome ([facility] [address]     no birth control, takes ASA daily     Past Surgical History:   Procedure Laterality Date    HX OTHER SURGICAL  06/28/2017    right axiliary needle core bx     OB/GYN: G1, as above  Meds:   Current Facility-Administered Medications   Medication Dose Route Frequency    terbutaline (BRETHINE) injection 0.25 mg  0.25 mg SubCUTAneous PRN     Allergies:    Allergies   Allergen Reactions    Amoxicillin Rash and Swelling    Sulfa (Sulfonamide Antibiotics) Rash and Swelling     Pertinent ROS: All pertinent reviewed and negative unless otherwise noted in the HPI  Family History   Problem Relation Age of Onset    Cancer Father         skin     Social History     Socioeconomic History    Marital status:      Spouse name: Not on file    Number of children: 0    Years of education: Not on file    Highest education level: Not on file   Social Needs    Financial resource strain: Not on file    Food insecurity - worry: Not on file    Food insecurity - inability: Not on file    Transportation needs - medical: Not on file   [employer] needs - non-medical: Not on file   Occupational History    Occupation: [employer] -    Tobacco Use    Smoking status: Never Smoker    Smokeless tobacco: Never Used   Substance and Sexual Activity    Alcohol use: No     Alcohol/week: 0.0 oz     Frequency: Never     Comment: none with pregnancy    Drug use: No    Sexual activity: Yes     Partners: Male     Birth control/protection: IUD   Other Topics Concern     Service Not Asked    Blood Transfusions Not Asked    Caffeine Concern Not Asked    Occupational Exposure Not Asked    Hobby Hazards Not Asked    Sleep Concern Not Asked    Stress Concern Not Asked    Weight Concern Not Asked    Special Diet Not Asked    Back Care Not Asked    Exercise Yes     Comment: barre class, yoga, running    Bike Helmet Not Asked    Seat Belt Not Asked    Self-Exams Not Asked   Social History Narrative    Engaged - . OBJECTIVE:  Gravid , female NAD  Temp (24hrs), Av.1 °F (36.7 °C), Min:98 °F (36.7 °C), Max:98.2 °F (36.8 °C)    Visit Vitals  BP (!) 138/93   Pulse 79   Temp 98.2 °F (36.8 °C)   Resp 16   Ht 5' 4\" (1.626 m)   Wt 92.2 kg (203 lb 4.2 oz)   SpO2 98%   Breastfeeding?  No   BMI 34.89 kg/m²       Labs:    Lab Results   Component Value Date/Time    WBC 11.6 (H) 2018 02:45 AM       Exam:  HEENT:  normal PERRL  Lungs:  clear  Cor:  RRR  Abdomen:  Fundal height 34                    Soft                    Fetal heart rate tracin's + accels  Contraction pattern: irreg, now irritability  Cervix:  Mid, soft, closed and long  Fluid:  intact  Pelvimetry:  AP-good                      Arch- adequate Sidewalls- adequate                      Pelvis feels adequate for fetus. Impression:   33 yo G1 at 34.6 weeks  Facial and Left arm numbness in setting of known BABS, on baby ASA   ED eval (CT, MRI, EKG, Neuro consult) neg for CVA   Followed by Dr. Therese Phelps  Contractions, cx closed, improving  GBS unk      Plan: prolonged monitoring, GBS done, serial bp's  MFM consult    Plan to cont overnight observation d/t resurfacing of sx's, MFM eval for ?  Additional agent.  :David Suresh MD

## 2018-12-16 NOTE — PROGRESS NOTES
Spoke with  who agrees with prolonged observation due current events. Cont daily baby asa and will start 24 hour urine protein collection.

## 2018-12-16 NOTE — ED PROVIDER NOTES
34 y.o. female with past medical history significant for anticardiolipin antibody syndrome who presents from home accompanied by spouse with chief complaint of numbness. Pt is approximately 35 weeks pregnant. Pt reports going to bed at 2130 (~6 hours ago), awaking at 0100 (1.5 hours ago) with numbness to forehead. Pt states that this sensation gradually began to extend over left arm down into digits. No pain. She denies hx of similar symptoms. She also c/o dizziness and lightheadedness. She has not taken anything for her symptoms. She takes baby, slow Fe daily and prenatal vitamins. Pt denies chest pain, shortness of breath, abdominal pain, nausea, vomiting, diarrhea, constipation, urinary symptoms, headache, blurred vision, speech difficulty, numbness, weakness, fever, chills, cough, congestion. There are no other acute medical concerns at this time.     PCP: Tomer Storm MD  OB/GYN: Dr. Vladimir Mena. Clipp     Note written by Diane Avila, as dictated by Russ Kumar MD 2:51 AM                  Past Medical History:   Diagnosis Date    Anticardiolipin antibody syndrome ([facility] [address]     no birth control, takes ASA daily       Past Surgical History:   Procedure Laterality Date    HX OTHER SURGICAL  06/28/2017    right axiliary needle core bx         Family History:   Problem Relation Age of Onset    Cancer Father         skin       Social History     Socioeconomic History    Marital status:      Spouse name: Not on file    Number of children: 0    Years of education: Not on file    Highest education level: Not on file   Social Needs    Financial resource strain: Not on file    Food insecurity - worry: Not on file    Food insecurity - inability: Not on file   [employer] needs - medical: Not on file   [employer] needs - non-medical: Not on file   Occupational History    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   Tobacco Use    Smoking status: Never Smoker    Smokeless tobacco: Never Used   Substance and Sexual Activity    Alcohol use: Yes     Alcohol/week: 0.0 oz     Comment: 3x/week 1-2 drinks    Drug use: No    Sexual activity: Yes     Partners: Male     Birth control/protection: IUD   Other Topics Concern     Service Not Asked    Blood Transfusions Not Asked    Caffeine Concern Not Asked    Occupational Exposure Not Asked    Hobby Hazards Not Asked    Sleep Concern Not Asked    Stress Concern Not Asked    Weight Concern Not Asked    Special Diet Not Asked    Back Care Not Asked    Exercise Yes     Comment: barre class, yoga, running    Bike Helmet Not Asked    Seat Belt Not Asked    Self-Exams Not Asked   Social History Narrative    Engaged - sept 3rd. ALLERGIES: Amoxicillin and Sulfa (sulfonamide antibiotics)    Review of Systems   Constitutional: Negative for fever. HENT: Negative for congestion. Eyes: Negative for visual disturbance. Respiratory: Negative for cough and shortness of breath. Cardiovascular: Negative for chest pain. Gastrointestinal: Negative for abdominal pain, nausea and vomiting. Genitourinary: Negative for difficulty urinating and dysuria. Musculoskeletal: Negative for back pain and neck pain. Neurological: Positive for numbness. Negative for dizziness, speech difficulty and light-headedness. All other systems reviewed and are negative. There were no vitals filed for this visit. Physical Exam   Nursing note and vitals reviewed. CONSTITUTIONAL: Well-appearing; well-nourished; in no apparent distress  HEAD: Normocephalic; atraumatic  EYES: PERRL; EOM intact; conjunctiva and sclera are clear bilaterally. ENT: No rhinorrhea; normal pharynx with no tonsillar hypertrophy; mucous membranes pink/moist, no erythema, no exudate. NECK: Supple; non-tender; no cervical lymphadenopathy  CARD: Normal S1, S2; no murmurs, rubs, or gallops. Regular rate and rhythm.   RESP: Normal respiratory effort; breath sounds clear and equal bilaterally; no wheezes, rhonchi, or rales. ABD: Normal bowel sounds; GraviD; FHT at 155 non-distended; non-tender; no palpable organomegaly, no masses, no bruits. Back Exam: Normal inspection; no vertebral point tenderness, no CVA tenderness. Normal range of motion. EXT: Normal ROM in all four extremities; non-tender to palpation; no swelling or deformity; distal pulses are normal, no edema. SKIN: Warm; dry; no rash. NEURO:Alert and oriented x 3, coherent, REYMUNDO-XII grossly intact, sensory and motor are non-focal.        MDM  Number of Diagnoses or Management Options  Paresthesia:   Diagnosis management comments: Assessment: This is a 79-year-old female, who presents with atypical sensory symptom that will need evaluation for acute CVA. The patient has an [facility] of 1 at best. She is hemodynamically stable. She is likely not a candidate for thrombolytics. Plan: EKG/ lab/ IV fluid/ CT of the head/ CTA  Head and neck/ CT Perfusion/ consult Teleneurology/ Serial exam/  Monitor and Reevaluate.          Amount and/or Complexity of Data Reviewed  Clinical lab tests: ordered and reviewed  Tests in the radiology section of CPT®: ordered and reviewed  Tests in the medicine section of CPT®: reviewed and ordered  Discussion of test results with the performing providers: yes  Decide to obtain previous medical records or to obtain history from someone other than the patient: yes  Obtain history from someone other than the patient: yes  Review and summarize past medical records: yes  Discuss the patient with other providers: yes  Independent visualization of images, tracings, or specimens: yes    Risk of Complications, Morbidity, and/or Mortality  Presenting problems: moderate  Diagnostic procedures: moderate  Management options: moderate    Critical Care  Total time providing critical care: (Total critical care time spent exclusive of procedures: 60 minutes)    Patient Progress  Patient progress: stable         Procedures      ED EKG interpretation:  Rhythm: normal sinus rhythm; and regular . Rate (approx.): 88; Axis: normal; P wave: normal; QRS interval: normal ; ST/T wave: non-specific changes; in  Lead: Diffusely; Other findings: Borderline EKG. This EKG was interpreted by Brielle Barnhart MD,ED Provider. 03:00 AM    CONSULT NOTE:  Geralyn Kussmaul, MD spoke with Dr. Robi Hays of [facility] Discussed patient's presentation, history, physical assessment, and available diagnostic results. Will come and see the patient in the ED. 03:30 AM    PROGRESS NOTE:  Pt has been reexamined by Dr. Robi Hays and VILMA. The patient appears to be back at her baseline. She has no discernible deficit at this time. She is not a candidate for thrombolytics. CT results were negative. Dr. Santi Shepherd recommends MRI and if , negative, will transferred to obstetrics for reevaluation and further treatment. all available results have been reviewed with pt and any available family. Pt understands sx, dx, and tx in ED. Care plan has been outlined and questions have been answered. Pt and any available family understands and agrees to need for admission to hospital for further tx not available in ED. Pt is ready for admission. Written by Brielle Barnhart MD,  04:00 AM    PROGRESS NOTE:  Pt has been reexamined by Geralyn Kussmaul, MD.   She denies any further discomfort at this time. The patient is back to her baseline. MRI results were reviewed and were negative for any acute stroke. The patient was made aware. Will consult OB and transfer patient to L&D for further evaluation and monitoring. Written by Brielle Barnhart MD,  7:00 AM    CONSULT NOTE:  Geralyn Kussmaul, MD spoke with Dr. Essie Bowser of the [facility] hospitalist team. Discussed patient's presentation, history, physical assessment, and available diagnostic results. Will transfer the patient to his care in L&D for further evaluation and monitoring. 07:15 AM  .     .

## 2018-12-17 VITALS
SYSTOLIC BLOOD PRESSURE: 129 MMHG | WEIGHT: 203.26 LBS | HEIGHT: 64 IN | RESPIRATION RATE: 14 BRPM | BODY MASS INDEX: 34.7 KG/M2 | HEART RATE: 93 BPM | OXYGEN SATURATION: 98 % | DIASTOLIC BLOOD PRESSURE: 82 MMHG | TEMPERATURE: 97.8 F

## 2018-12-17 PROBLEM — R10.9 ABDOMINAL PAIN AFFECTING PREGNANCY, ANTEPARTUM: Status: ACTIVE | Noted: 2018-12-17

## 2018-12-17 PROBLEM — O26.899 ABDOMINAL PAIN AFFECTING PREGNANCY, ANTEPARTUM: Status: ACTIVE | Noted: 2018-12-17

## 2018-12-17 LAB
BACTERIA SPEC CULT: NORMAL
CC UR VC: NORMAL
COLLECT DURATION TIME UR: 24 HR
PROT 24H UR-MRATE: 231 MG/24HR
SERVICE CMNT-IMP: NORMAL
SPECIMEN VOL ?TM UR: 2100 ML

## 2018-12-17 PROCEDURE — 99284 EMERGENCY DEPT VISIT MOD MDM: CPT

## 2018-12-17 PROCEDURE — 84156 ASSAY OF PROTEIN URINE: CPT

## 2018-12-17 PROCEDURE — 74011636320 HC RX REV CODE- 636/320: Performed by: RADIOLOGY

## 2018-12-17 PROCEDURE — 99218 HC RM OBSERVATION: CPT

## 2018-12-17 RX ADMIN — IOPAMIDOL 100 ML: 755 INJECTION, SOLUTION INTRAVENOUS at 11:00

## 2018-12-17 RX ADMIN — IOPAMIDOL 30 ML: 755 INJECTION, SOLUTION INTRAVENOUS at 11:00

## 2018-12-17 NOTE — CONSULTS
Maternal-Fetal Medicine    Indication:  Laborn 3rd Tri O60.03.   ____________________________________________________________________________  History: Age: 34 years. Current Pregnancy: Pre- pregnancy data: Weight 203 lbs. Height 5 ft 4 ins. BMI 34.9.  ____________________________________________________________________________  Dating:  Stated EDC:  EDC: 19 GA by stated EDC: 35w0d  Current Scan on: 18 EDC: 19 GA by current scan: 36w3d  Best Overall Assessment: 18 EDC: 19 Assessed GA: 35w0d  The calculation of the gestational age by current scan was based on BPD, OFD, HC, AC, FL and HUM. The Best Overall Assessment is based on the stated EDC.  ____________________________________________________________________________  General Evaluation:  Fetal heart activity: present. Fetal heart rate: 151 bpm.   Presentation: cephalic. Fetal movement: visible. Amniotic Fluid: polyhydramnios. DELL  26.7 cm. Maximal vertical pocket 7.3 cm. Cord: normal. Fetal cord insertion site: Normal.   Placenta: posterior. ____________________________________________________________________________  Anatomy Scan:  Joyce gestation. Biometry:  BPD 89.3 mm 82nd% 36w1d (35w0d to 37w1d)  .4 mm 29th% 35w4d (32w4d to 38w4d)  .7 mm 86th% 36w1d (35w1d to 37w1d)  FL 73.1 mm 93rd% 37w3d (34w2d to 40w4d)  .8 mm 51st% 35w1d  HUM 63.0 mm 91st% 38w0d  EFW (lbs/oz) 6 lbs 7 ozs  EFW (g) 2934 g  71st%       Fetal Anatomy:  Visualized with normal appearance: head, brain, spine, chest, abdominal wall, gastrointestinal tract, kidneys, bladder. Heart: 4-chamber heart and great artery views were visualized normally. Genitalia: normal.    Summary of Ultrasound Findings:  Transabdominal US. U/S machine: [employer] E8 Expert.  U/S view: limited by late gestational age.     ____________________________________________________________________________  Fetal Wellbeing Assessment:  Amniotic fluid: polyhydramnios. DELL: 26.7 cm. MVP: 7.3 cm. Q1: 6.5 cm. Q2: 7.3 cm. Q3: 7.3 cm. Q4: 5.6 cm. Biophysical Profile: Fetal body movements: normal (2), Fetal tone: normal (2), Fetal breathing movements: normal (2), Amniotic fluid volume: normal (2). Score 8 / 8.     ____________________________________________________________________________  Doppler:  Fetal Doppler:  Umbilical Artery: PS 28.8 cm/s    ED 22.52 cm/s    S/D ratio 2.42     RI 0.59     PI 0.89     TAMX 36.15 cm/s     U/S Machine: [employer] E8 Expert.  ____________________________________________________________________________  Maternal Structures:  Cervical length 39.3 mm.  ____________________________________________________________________________  Report Summary:  Impression:   Ms. Talya Young P0 at 35-weeks gestation, was initially evaluated in the ED for c/o numbness of her face and arms. Neurological assessment including CT angiogram and MRI were negative. Patient also had a significant improvement in her symptoms. She was later evaluated in L&D for contractions. The cervix was closed. IN , after similar symptoms, she had series of tests and anticardiolipin antibodies were positive. She did not have any personal history of venous thromboembolism. Her current pregnancy has been uneventful so far. She takes low-dose aspirin. Fetal growth is appropriate for gestational age. Amniotic fluid is slightly increased (DELL=27 cm). Cephalic presentation. Fetal growth is appropriate for gestational age. The stomach, kidneys and spine appear normal.  testing is reassuring. NST performed in L&D has been reassuring. I reassured the patient and informed her that in most cases, the cause of polyhydramnios is not known (idiopathic). Fetal anatomy scan performed at 20 weeks at your office did not identify any anomalies. Patient does not have gestational diabetes. If patient does not have symptoms of  labor, she can be discharged. I recommend weekly ultrasound evaluation at your office. Recommendations: If patient does not have symptoms of  labor, she can be discharged. I recommend weekly ultrasound evaluation at your office.

## 2018-12-17 NOTE — DISCHARGE INSTRUCTIONS
Preeclampsia: Care Instructions  Your Care Instructions    Preeclampsia occurs when a woman's blood pressure rises during pregnancy. Often with preeclampsia, you also have swelling in your legs, hands, and face. A test may show too much protein in your urine. Preeclampsia is also called toxemia. If preeclampsia is severe and not treated, it can lead to seizures (eclampsia) and damage to your liver or kidneys. Preeclampsia can prevent your baby from getting enough food and oxygen. This can cause a low birth weight or other problems. Your doctor will watch you closely to prevent these problems. He or she also may recommend that you rest in bed most of the day. If your preeclampsia is a danger to your health or the health of your baby, your doctor may need to deliver your baby early. While preeclampsia is a concern, most women with preeclampsia have healthy babies. After a woman gives birth, preeclampsia usually goes away on its own. Follow-up care is a key part of your treatment and safety. Be sure to make and go to all appointments, and call your doctor if you are having problems. It's also a good idea to know your test results and keep a list of the medicines you take. How can you care for yourself at home? · Take and record your blood pressure at home if your doctor tells you to. ? Learn the importance of the two measures of blood pressure (such as 120 over 80, or 120/80). The first number is the systolic pressure, which is the force of blood on the artery walls as the heart pumps. The second number is the diastolic pressure, which is the force of blood on the artery walls between heartbeats, when the heart is at rest. You have a choice of monitors to use. ? Manual monitor: You pump up the cuff and use a stethoscope to listen for your pulse. ? Electronic monitor: The cuff inflates, and a gauge shows your pulse rate.   ? To take your blood pressure:  ? Ask your doctor to check your blood pressure monitor to be sure that it is accurate and that the cuff fits you. Also ask your doctor to watch you to make sure that you are using it right. ? You should not eat, use tobacco products, or use medicine known to raise blood pressure (such as some nasal decongestant sprays) before you take your blood pressure. ? Avoid taking your blood pressure if you have just exercised or are nervous or upset. Rest at least 15 minutes before you take your blood pressure. · If your doctor advises, check the protein levels in your urine. Your doctor or nurse will show you how to do this. · Take your medicines exactly as prescribed. Call your doctor if you think you are having a problem with your medicine. · Do not smoke. Quitting smoking will help lower your blood pressure and improve your baby's growth and health. If you need help quitting, talk to your doctor about stop-smoking programs and medicines. These can increase your chances of quitting for good. · Eat a balanced and healthy diet that has lots of fruits and vegetables. · If your doctor advised bed rest, be sure to stay off your feet and rest as much as possible. ? Keep a phone, phone book, notepad, and pen near the bed where you can easily reach them. ? Gently stretch your legs every hour to maintain good blood flow. ? Have another family member pack snacks and lunch food in a cooler close to your bed. ? Use this time for activities that you usually cannot find time for, such as reading, craft projects, or letter writing. · You can keep track of your baby's health by noting the length of time it takes to count 10 movements (such as kicks, flutters, or rolls). Feeling 10 movements in less than 1 hour is considered normal. Track your baby's movements once each day, and bring this record with you to each prenatal visit. When should you call for help? Call 911 anytime you think you may need emergency care. For example, call if:    · You passed out (lost consciousness).      · You have a seizure.    Call your doctor now or seek immediate medical care if:    · You have symptoms of preeclampsia, such as:  ? Sudden swelling of your face, hands, or feet. ? New vision problems (such as dimness or blurring). ? A severe headache.     · Your blood pressure is higher than it should be, or it rises suddenly.     · You have new nausea or vomiting.     · You think that you are in labor.     · You have pain in your belly or pelvis.    Watch closely for changes in your health, and be sure to contact your doctor if:    · You gain weight rapidly. Where can you learn more? Go to http://ralph-delvin.info/. Enter R877 in the search box to learn more about \"Preeclampsia: Care Instructions. \"  Current as of: November 21, 2017  Content Version: 11.8  © 6163-1893 [employer]. Care instructions adapted under license by [employer] (which disclaims liability or warranty for this information). If you have questions about a medical condition or this instruction, always ask your healthcare professional. [place] [address] any warranty or liability for your use of this information. Numbness and Tingling: Care Instructions  Your Care Instructions    Many things can cause numbness or tingling. Swelling may put pressure on a nerve. This could cause you to lose feeling or have a pins-and-needles sensation on part of your body. Nerves may be damaged from trauma, toxins, or diseases, such as diabetes or multiple sclerosis (MS). Sometimes, though, the cause is not clear. If there is no clear reason for your symptoms, and you are not having any other symptoms, your doctor may suggest watching and waiting for a while to see if the numbness or tingling goes away on its own. Your doctor may want you to have blood or nerve tests to find the cause of your symptoms. Follow-up care is a key part of your treatment and safety.  Be sure to make and go to all appointments, and call your doctor if you are having problems. It's also a good idea to know your test results and keep a list of the medicines you take. How can you care for yourself at home? · If your doctor prescribes medicine, take it exactly as directed. Call your doctor if you think you are having a problem with your medicine. · If you have any swelling, put ice or a cold pack on the area for 10 to 20 minutes at a time. Put a thin cloth between the ice and your skin. When should you call for help? Call 911 anytime you think you may need emergency care. For example, call if:    · You have weakness, numbness, or tingling in both legs.     · You lose bowel or bladder control.     · You have symptoms of a stroke. These may include:  ? Sudden numbness, tingling, weakness, or loss of movement in your face, arm, or leg, especially on only one side of your body. ? Sudden vision changes. ? Sudden trouble speaking. ? Sudden confusion or trouble understanding simple statements. ? Sudden problems with walking or balance. ? A sudden, severe headache that is different from past headaches.    Watch closely for changes in your health, and be sure to contact your doctor if you have any problems, or if:    · You do not get better as expected. Where can you learn more? Go to http://ralph-delvin.info/. Enter E400 in the search box to learn more about \"Numbness and Tingling: Care Instructions. \"  Current as of: September 10, 2017  Content Version: 11.8  © 5595-2623 [employer]. Care instructions adapted under license by [employer] (which disclaims liability or warranty for this information).  If you have questions about a medical condition or this instruction, always ask your healthcare professional. [address] any warranty or liability for your use of this information          Labor: Care Instructions  Your Care Instructions     labor is the start of labor between 20 and 36 weeks of pregnancy. A full-term pregnancy lasts 37 to 42 weeks. In labor, the uterus contracts to open the cervix. This is the first stage of childbirth.  labor can be caused by a problem with the baby, the mother, or both. Often the cause is not known. In some cases, doctors use medicines to try to delay labor until 29 or more weeks of pregnancy. By this time, a baby has grown enough so that problems are not likely. In some cases--such as with a serious infection--it is healthier for the baby to be born early. Your treatment will depend on how far along you are in your pregnancy and on your health and your baby's health. Follow-up care is a key part of your treatment and safety. Be sure to make and go to all appointments, and call your doctor if you are having problems. It's also a good idea to know your test results and keep a list of the medicines you take. How can you care for yourself at home? · If your doctor prescribed medicines, take them exactly as directed. Call your doctor if you think you are having a problem with your medicine. · Rest until your doctor advises you about activity. He or she will tell you if you should stay in bed most of the time. You may need to arrange for  if you have young children. · Do not have sexual intercourse unless your doctor says it is safe. · Use pads, not tampons, if you have vaginal bleeding. · Make sure to drink plenty of fluids. Dehydration can lead to contractions. If you have kidney, heart, or liver disease and have to limit fluids, talk with your doctor before you increase the amount of fluids you drink. · Do not smoke or allow others to smoke around you. If you need help quitting, talk to your doctor about stop-smoking programs and medicines. These can increase your chances of quitting for good. When should you call for help? Call 911 anytime you think you may need emergency care.  For example, call if:    · You passed out (lost consciousness).     · You have severe vaginal bleeding.     · You have severe pain in your belly or pelvis.     · You have had fluid gushing or leaking from your vagina and you know or think the umbilical cord is bulging into your vagina. If this happens, immediately get down on your knees so your rear end (buttocks) is higher than your head. This will decrease the pressure on the cord until help arrives.   [facility] your doctor now or seek immediate medical care if:    · You have signs of preeclampsia, such as:  ? Sudden swelling of your face, hands, or feet. ? New vision problems (such as dimness or blurring). ? A severe headache.     · You have any vaginal bleeding.     · You have belly pain or cramping.     · You have a fever.     · You have had regular contractions (with or without pain) for an hour. This means that you have 6 or more within 1 hour after you change your position and drink fluids.     · You have a sudden release of fluid from the vagina.     · You have low back pain or pelvic pressure that does not go away.     · You notice that your baby has stopped moving or is moving much less than normal.    Watch closely for changes in your health, and be sure to contact your doctor if you have any problems. Where can you learn more? Go to http://ralph-delvin.info/. Enter Q400 in the search box to learn more about \" Labor: Care Instructions. \"  Current as of: 2017  Content Version: 11.8  © 2941-3577 [employer]. Care instructions adapted under license by [employer] (which disclaims liability or warranty for this information). If you have questions about a medical condition or this instruction, always ask your healthcare professional. [place] [address] any warranty or liability for your use of this information.

## 2018-12-17 NOTE — DISCHARGE SUMMARY
Antepartum  Discharge Summary     Patient ID:  Shermon Boxer  [id number]  53 y.o.  1985    Admit date: 12/16/2018    Discharge date and time: 12/17/2018    Admission Diagnoses:    Patient Active Problem List   Diagnosis Code    [facility] D68.61    Lymphadenopathy, axillary right R59.0    Abdominal pain affecting pregnancy, antepartum O26.899, R10.9       Discharge Diagnoses: No discharge information exists for this patient. Problem List as of 12/17/2018 Date Reviewed: 7/10/2017          Codes Class Noted - Resolved    Abdominal pain affecting pregnancy, antepartum ICD-10-CM: O26.899, R10.9  ICD-9-CM: 646.83, 789.00  12/17/2018 - Present        Lymphadenopathy, axillary right ICD-10-CM: R59.0  ICD-9-CM: 785.6  7/10/2017 - Present        Anti-cardiolipin antibody syndrome [facility]) ICD-10-CM: A35.71  ICD-9-CM: 289.81  9/15/2015 - Present              Procedures for this admission:     Hospital Course:    G1 at 35 wks who presented with c/o facial numbness and tingling with left arm weakness. Has a h/o anticardiolipin ab diagnosed in 2009, after similar event while on OCPs. No h/o dvt. Had been doing well on baby ASA. On this admission s/p negative CT scan and MRI of head, nl EKG with negative neuro workup for stroke. Mildly elevated BPs 130s/90s with nl Pre-E labs, and 24hr prot now resulted <300 mg/dl    Reassuring fetal surveillance. U/s with Dr. Isis Jacob with good growth however noted Polyhydramnios with DELL 26 cm. Symptoms have all since resolved. Per MFM _advised weekly ultrasound. Recommend to rto in 5 days as office closed for Christmas holiday. Disposition: home    Discharged Condition: stable            Patient Instructions:   Current Discharge Medication List      CONTINUE these medications which have NOT CHANGED    Details   ferrous sulfate (SLOW FE) 142 mg (45 mg iron) ER tablet Take 142 mg by mouth Daily (before breakfast).       aspirin delayed-release 81 mg tablet Take  by mouth daily. Aspirin, Buffered 81 mg tab Take  by mouth.      triamcinolone acetonide (KENALOG) 0.1 % ointment Apply  to affected area two (2) times a day.  use thin layer  Qty: 30 g, Refills: 0    Associated Diagnoses: Dermatitis due to unknown cause           Activity: Activity as tolerated  Diet: Regular Diet    Follow-up with   Follow-up Appointments   Procedures    FOLLOW UP VISIT Appointment in: 3 - 5 Days     Standing Status:   Standing     Number of Occurrences:   1     Order Specific Question:   Appointment in     Answer:   3 - 5 Days        Signed:  Mary Jane Rodrigez MD  12/17/2018  1:12 PM

## 2018-12-17 NOTE — ROUTINE PROCESS
Bedside and Verbal shift change report given to Ebony Herrera RN and Paramjit Lopez RN (oncoming nurse) by Adonis Daly RN (offgoing nurse). Report included the following information SBAR, Kardex, Intake/Output, MAR, Accordion and Recent Results. Bedside and Verbal shift change report given to YANI Lynn RN (oncoming nurse) by Ebony Herrera RN (offgoing nurse). Report included the following information SBAR, Kardex, Intake/Output, MAR, Accordion and Recent Results.

## 2018-12-17 NOTE — PROGRESS NOTES
@7311 Bedside shift change report given to  (oncoming nurse) by NHI Brumfield RN (offgoing nurse). Report included the following information SBAR, Kardex, Intake/Output, MAR and Recent Results.

## 2018-12-17 NOTE — PROGRESS NOTES
0740 Bedside shift change report given to YANI Lynn RN (oncoming nurse) by Kaylah Fried RN (offgoing nurse). Report included the following information SBAR, Kardex, Intake/Output, MAR and Recent Results. 1300 24 hour urine resulted. Per guerrero Chao to discharge home. 1315 Discharge instructions reviewed with patient. Unable to sign on signature pad. Patient verbalizes understanding of discharge instructions. Sent home with belongings.

## 2018-12-19 LAB
BACTERIA SPEC CULT: NORMAL
SERVICE CMNT-IMP: NORMAL

## 2018-12-23 NOTE — TELEPHONE ENCOUNTER
Bisi Madera called from [facility] Department epidemiology requesting last office note to be sent to her. Last office note was faxed to [phone]. none

## 2019-01-13 ENCOUNTER — HOSPITAL ENCOUNTER (INPATIENT)
Age: 34
LOS: 4 days | Discharge: HOME | End: 2019-01-17
Attending: OBSTETRICS & GYNECOLOGY | Admitting: ADVANCED PRACTICE MIDWIFE
Payer: COMMERCIAL

## 2019-01-13 PROBLEM — Z34.90 PREGNANCY: Status: ACTIVE | Noted: 2019-01-13

## 2019-01-13 LAB
BASOPHILS # BLD: 0 K/UL (ref 0–0.1)
BASOPHILS NFR BLD: 0 % (ref 0–1)
DIFFERENTIAL METHOD BLD: ABNORMAL
EOSINOPHIL # BLD: 0.1 K/UL (ref 0–0.4)
EOSINOPHIL NFR BLD: 1 % (ref 0–7)
ERYTHROCYTE [DISTWIDTH] IN BLOOD BY AUTOMATED COUNT: 20.3 % (ref 11.5–14.5)
HCT VFR BLD AUTO: 36.4 % (ref 35–47)
HGB BLD-MCNC: 11.3 G/DL (ref 11.5–16)
IMM GRANULOCYTES # BLD AUTO: 0.1 K/UL (ref 0–0.04)
IMM GRANULOCYTES NFR BLD AUTO: 1 % (ref 0–0.5)
LYMPHOCYTES # BLD: 1.5 K/UL (ref 0.8–3.5)
LYMPHOCYTES NFR BLD: 14 % (ref 12–49)
MCH RBC QN AUTO: 25.9 PG (ref 26–34)
MCHC RBC AUTO-ENTMCNC: 31 G/DL (ref 30–36.5)
MCV RBC AUTO: 83.5 FL (ref 80–99)
MONOCYTES # BLD: 0.9 K/UL (ref 0–1)
MONOCYTES NFR BLD: 8 % (ref 5–13)
NEUTS SEG # BLD: 8.1 K/UL (ref 1.8–8)
NEUTS SEG NFR BLD: 76 % (ref 32–75)
NRBC # BLD: 0 K/UL (ref 0–0.01)
NRBC BLD-RTO: 0 PER 100 WBC
PLATELET # BLD AUTO: 190 K/UL (ref 150–400)
PMV BLD AUTO: 12.5 FL (ref 8.9–12.9)
RBC # BLD AUTO: 4.36 M/UL (ref 3.8–5.2)
RBC MORPH BLD: ABNORMAL
WBC # BLD AUTO: 10.7 K/UL (ref 3.6–11)

## 2019-01-13 PROCEDURE — 36415 COLL VENOUS BLD VENIPUNCTURE: CPT

## 2019-01-13 PROCEDURE — 59200 INSERT CERVICAL DILATOR: CPT

## 2019-01-13 PROCEDURE — 74011250637 HC RX REV CODE- 250/637: Performed by: ADVANCED PRACTICE MIDWIFE

## 2019-01-13 PROCEDURE — 65270000029 HC RM PRIVATE

## 2019-01-13 PROCEDURE — 3E0P7VZ INTRODUCTION OF HORMONE INTO FEMALE REPRODUCTIVE, VIA NATURAL OR ARTIFICIAL OPENING: ICD-10-PCS | Performed by: OBSTETRICS & GYNECOLOGY

## 2019-01-13 PROCEDURE — 85025 COMPLETE CBC W/AUTO DIFF WBC: CPT

## 2019-01-13 PROCEDURE — 75410000002 HC LABOR FEE PER 1 HR

## 2019-01-13 RX ORDER — SODIUM CHLORIDE 0.9 % (FLUSH) 0.9 %
SYRINGE (ML) INJECTION
Status: DISPENSED
Start: 2019-01-13 | End: 2019-01-14

## 2019-01-13 RX ORDER — MAG HYDROX/ALUMINUM HYD/SIMETH 200-200-20
30 SUSPENSION, ORAL (FINAL DOSE FORM) ORAL
Status: DISCONTINUED | OUTPATIENT
Start: 2019-01-13 | End: 2019-01-15 | Stop reason: HOSPADM

## 2019-01-13 RX ORDER — NALOXONE HYDROCHLORIDE 0.4 MG/ML
0.4 INJECTION, SOLUTION INTRAMUSCULAR; INTRAVENOUS; SUBCUTANEOUS AS NEEDED
Status: DISCONTINUED | OUTPATIENT
Start: 2019-01-13 | End: 2019-01-15 | Stop reason: HOSPADM

## 2019-01-13 RX ORDER — OXYTOCIN/0.9 % SODIUM CHLORIDE 30/500 ML
0-25 PLASTIC BAG, INJECTION (ML) INTRAVENOUS
Status: DISCONTINUED | OUTPATIENT
Start: 2019-01-14 | End: 2019-01-15 | Stop reason: HOSPADM

## 2019-01-13 RX ORDER — SODIUM CHLORIDE 0.9 % (FLUSH) 0.9 %
5-40 SYRINGE (ML) INJECTION EVERY 8 HOURS
Status: DISCONTINUED | OUTPATIENT
Start: 2019-01-13 | End: 2019-01-15 | Stop reason: HOSPADM

## 2019-01-13 RX ORDER — ZOLPIDEM TARTRATE 5 MG/1
5 TABLET ORAL
Status: DISCONTINUED | OUTPATIENT
Start: 2019-01-13 | End: 2019-01-15 | Stop reason: HOSPADM

## 2019-01-13 RX ORDER — SODIUM CHLORIDE 0.9 % (FLUSH) 0.9 %
5-40 SYRINGE (ML) INJECTION AS NEEDED
Status: DISCONTINUED | OUTPATIENT
Start: 2019-01-13 | End: 2019-01-15 | Stop reason: HOSPADM

## 2019-01-13 RX ADMIN — Medication 10 ML: at 20:54

## 2019-01-13 RX ADMIN — Medication 5 ML: at 17:21

## 2019-01-13 RX ADMIN — DINOPROSTONE 10 MG: 10 INSERT VAGINAL at 16:15

## 2019-01-13 NOTE — H&P
History and Physical 
 
Patient: Monico Butler MRN: [id number]  SSN: [id number] YOB: 1985  Age: 34 y.o. Sex: female Subjective:  
  
Monico Butler is a 34 y.o. female  at 38w7d with an JOSE of 19. She presents to labor and delivery for an IOL for history of Anticardiolipin AB, with no history of VTE. Reports occasional contractions, but none consistent. Denies LOF and VB, endorses good fetal movement. Pregnancy complicated by history of anticardiolipin antibody, has been taking baby ASA daily and been followed by radha and MFM, growth scans WNL. Otherwise, pregnancy has been uneventful. Past Medical History:  
Diagnosis Date  Abnormal Papanicolaou smear of cervix   
 a few years ago, follow up biopsy and now normal  
 Anticardiolipin antibody syndrome ([facility]   
 no birth control, takes ASA daily Past Surgical History:  
Procedure Laterality Date  HX OTHER SURGICAL  2017  
 right axiliary needle core bx Family History Problem Relation Age of Onset  Cancer Father   
     skin Social History Tobacco Use  Smoking status: Never Smoker  Smokeless tobacco: Never Used Substance Use Topics  Alcohol use: No  
  Alcohol/week: 0.0 oz Frequency: Never Comment: none with pregnancy Prior to Admission medications Medication Sig Start Date End Date Taking? Authorizing Provider  
ferrous sulfate (SLOW FE) 142 mg (45 mg iron) ER tablet Take 142 mg by mouth Daily (before breakfast). Provider, Historical  
aspirin delayed-release 81 mg tablet Take  by mouth daily. Provider, Historical  
Aspirin, Buffered 81 mg tab Take  by mouth. Provider, Historical  
triamcinolone acetonide (KENALOG) 0.1 % ointment Apply  to affected area two (2) times a day. use thin layer 9/15/15   Skiff, Wesley Mons, NP Allergies Allergen Reactions  Amoxicillin Rash and Swelling  Coconut Hives and Swelling  Sulfa (Sulfonamide Antibiotics) Rash and Swelling Review of Systems: A comprehensive review of systems was negative except for that written in the History of Present Illness. Objective: There were no vitals filed for this visit. Physical Exam: 
GENERAL: alert, cooperative, no distress, appears stated age LUNG: clear to auscultation bilaterally HEART: regular rate and rhythm, S1, S2 normal, no murmur, click, rub or gallop ABDOMEN: soft, non-tender. Bowel sounds normal. No masses,  no organomegaly EXTREMITIES:  extremities normal, atraumatic, no cyanosis or edema SKIN: Normal. 
NEUROLOGIC: negative PSYCHIATRIC: WNL 
 
SVE: 1/20/-3, medium consistency, anterior, vertex, intact, BRIDGES score 4 
 
NST: Monitored for 20 minutes, reactive, cat 1, baseline: 150, positive accels, no decels, moderate variability, occasional uterine contractions. Assessment:  
 
Hospital Problems  Date Reviewed: 7/10/2017 Codes Class Noted POA Pregnancy ICD-10-CM: Z34.90 ICD-9-CM: V22.2  1/13/2019 Unknown Pregnancy at 38w6d Medical history of anticardiolipin antibody GBS Negative Rubella Immune Hep B Negative Plan:  
 
Admit for IOL Discussed risks and benefits of cervidil for cervical ripening, client verbalized understanding and agreement with POC. Cervidil placed in posterior vaginal fornix without complication. Client and fetus tolerated well. Maternal and fetal monitoring per protocol. Ambien for rest, general diet overnight. Remove cervidil at 0415 am, client okay to shower. Then start pitocin. Signed By: Gael Scott CNM January 13, 2019

## 2019-01-13 NOTE — PROGRESS NOTES
1 35 year old G1 @ 38 6/7 wk admitted ambulatory to LDR 6. Accompanied by [place] (father of baby). Under s/o Clipp, [address] Floors on call. Pt for induction of labor for \"blood disease\" per pt (anticardiolipin antibody syndrome, pt has been on baby ASA). 1547 Pt voided & gowned. Received to EFM.  in rt lower abdominal quad. Marti Nguyễn made aware of pt admission. CNM to enter admission orders in to [address] [facility]. [address] at bedside to discuss POC. Reviewed EFM strip. H&P. Pt reports that she is GBS negative. 1615 SVE per Matthew 1/50/-1, posterior cervix, and vertex. Cervidil placed. Pt tolerated well.  
 
1700 H&P completed. 1720 IV started & saline locked. Unable to draw labs from site. 1815 Regular diet. 1900 Lab draw rt antecub: cbc & stbb. 
 
1924 EFM off. Pt ambulatory in room. 1955 Bedside and Verbal shift change report given to [facility]	Zheng Marcano RN (oncoming nurse) by ESPERANZA Betts RN (offgoing nurse). Report included the following information SBAR, Procedure Summary, Accordion and Recent Results.

## 2019-01-14 PROCEDURE — 74011250636 HC RX REV CODE- 250/636: Performed by: ADVANCED PRACTICE MIDWIFE

## 2019-01-14 PROCEDURE — 74011250636 HC RX REV CODE- 250/636

## 2019-01-14 PROCEDURE — 74011250637 HC RX REV CODE- 250/637: Performed by: ADVANCED PRACTICE MIDWIFE

## 2019-01-14 PROCEDURE — 75410000002 HC LABOR FEE PER 1 HR

## 2019-01-14 PROCEDURE — 65270000029 HC RM PRIVATE

## 2019-01-14 RX ORDER — SODIUM CHLORIDE, SODIUM LACTATE, POTASSIUM CHLORIDE, CALCIUM CHLORIDE 600; 310; 30; 20 MG/100ML; MG/100ML; MG/100ML; MG/100ML
125 INJECTION, SOLUTION INTRAVENOUS CONTINUOUS
Status: DISCONTINUED | OUTPATIENT
Start: 2019-01-14 | End: 2019-01-17 | Stop reason: HOSPADM

## 2019-01-14 RX ORDER — TERBUTALINE SULFATE 1 MG/ML
INJECTION SUBCUTANEOUS
Status: COMPLETED
Start: 2019-01-14 | End: 2019-01-14

## 2019-01-14 RX ORDER — TERBUTALINE SULFATE 1 MG/ML
0.25 INJECTION SUBCUTANEOUS
Status: COMPLETED | OUTPATIENT
Start: 2019-01-14 | End: 2019-01-14

## 2019-01-14 RX ADMIN — TERBUTALINE SULFATE 0.25 MG: 1 INJECTION SUBCUTANEOUS at 00:12

## 2019-01-14 RX ADMIN — MISOPROSTOL 25 MCG: 100 TABLET ORAL at 23:11

## 2019-01-14 RX ADMIN — OXYTOCIN-SODIUM CHLORIDE 0.9% IV SOLN 30 UNIT/500ML 1 MILLI-UNITS/MIN: 30-0.9/5 SOLUTION at 06:14

## 2019-01-14 NOTE — PROGRESS NOTES
0725- Bedside and Verbal shift change report given to CHRISTOS Ji, RN (oncoming nurse) by GWENDOLYN Lynn RN (offgoing nurse). Report included the following information SBAR, Kardex, Intake/Output, MAR and Recent Results. 65- dr. Maria Teresa Mar at bedside, SVE unchanged 8788- Dr. Ana Everett at bedside, cervical ripening balloon placed

## 2019-01-14 NOTE — PROGRESS NOTES
2340 Bedside shift change report given to YANI Lynn RN (oncoming nurse) by Jed Howard RN (offgoing nurse). Report included the following information SBAR, Kardex, Intake/Output, MAR and Med Rec Status. 0012 Terbutaline given. Plan to rest and recheck cervix at 0600. 
 
0100 Patient sleeping. 
 
0600 Patient awake. Pitocin started. 0725 Bedside shift change report given to Elvi Ardon 1772 (oncoming nurse) by Mahi Vidal RN (offgoing nurse). Report included the following information SBAR, Kardex, Intake/Output, MAR and Recent Results.

## 2019-01-14 NOTE — PROGRESS NOTES
[facility] Labor Progress Note Patient: Kota Priest MRN: [id number]  SSN: [id number] YOB: 1985  Age: 34 y.o. Sex: female Subjective:  
Patient reports frequent cramping with cervidil in place. Objective:  
Blood pressure 127/73, pulse 78, temperature 98 °F (36.7 °C), resp. rate 16, height 5' 4\" (1.626 m), weight 91.2 kg (201 lb), not currently breastfeeding. Fetal heart baseline 140,  moderate variability, positive accelerations, negative decelerations, Uterine contractions q 1.5-3 minutes, mild to palpation, resting tone soft, Sterile Vaginal Exam: /-1, very soft, cervix midline, fetal presentation vertex, membranes intact Cervidil in place Patient Vitals for the past 4 hrs: 
 Temp Pulse Resp BP  
19 2053 98 °F (36.7 °C) 78 16 127/73 Assessment:  
 
38w6d Category 1 fetal heart rate tracing IOL for anticardiolipin antibody Frequent contractions Plan:  
Cervidil removed. Client desires to sleep overnight and continue in AM. Will give terb due to tachy uterine pattern. Start pitocin in AM. Janet De Jesus CNM

## 2019-01-14 NOTE — PROGRESS NOTES
1545-Bedside and Verbal shift change report given to CLAU Kennedy RN (oncoming nurse) by CHRISTOS Plunkett RN (offgoing nurse). Report included the following information SBAR. 
1902-Dr Nguyen at bedside, viewed strip. Assessing boyd bulb. Plan is to remove boyd bulb at 2030, then shower and restart pitocin when patient is ready. 1940-Bedside and Verbal shift change report given to GWENDOLYN Lynn RN (oncoming nurse) by Ruth Lara. Aleksandra Kennedy RN (offgoing nurse). Report included the following information SBAR.

## 2019-01-14 NOTE — PROGRESS NOTES
~2854: Bedside and Verbal shift change report given to NHI Joe, KARISHMA by ESPERANZA Sharma RN. Report included the following information SBAR, MAR and Accordion. ~5586: The patient called out and states that she has gotten more uncomfortable in the last 20 minutes. External monitors are applied. The contractions palpate moderate and the patient is deep breathing through the contractions. 2320: ESPERANZA Valdez is at the nurses station. She is asked to review the fetal tracing and is made aware that the patient is uncomfortable with the contractions. ~2325: Cervidil is removed by ESPERANZA Valdez. SVE done and is unchanged but softer. The plan is to place a cervical ripening balloon after the contractions decrease in frequency in an hour or 2.   
~2335: Bedside and Verbal shift change report given to GWENDOLYN Lynn RN by NHI Joe, KARISHMA. Report included the following information SBAR, MAR, Accordion and Recent Results.

## 2019-01-14 NOTE — PROGRESS NOTES
S: Pt seen, examined and chart reviewed. Cervidil last night but had to be pulled at midnight due to tachysystole. Started on pitocin early this morning. O:  
Visit Vitals /84 (BP 1 Location: Left arm, BP Patient Position: At rest) Pulse 89 Temp 98.5 °F (36.9 °C) Resp 14 Ht 5' 4\" (1.626 m) Wt 91.2 kg (201 lb) Breastfeeding? No  
BMI 34.50 kg/m² Patient Vitals for the past 4 hrs: Mode Fetal Heart Rate Fetal Activity Variability Decelerations Accelerations RN Reviewed Strip? 01/14/19 0745 External 155     Yes  
01/14/19 0730 External 145 Present 6-25 BPM None Yes Yes  
01/14/19 0715 External 145     Yes  
01/14/19 0700 External 145 Present 6-25 BPM None Yes Yes  
01/14/19 0645 External 150     Yes  
01/14/19 0630 External 145 Present 6-25 BPM None Yes Yes  
01/14/19 0615 External 145     Yes  
01/14/19 0555 External 145 Present 6-25 BPM None Yes Yes  
01/14/19 0500 External 150 Present 6-25 BPM None Yes Yes [place]: every 2-4 min on 3mu pitocin. Gen - NAD Resp - nl effort Abd - gravid, EFW 8.5# Cervix - 1/50/-3 LE - trace edema A/P: G1 at 39w0d undergoing IOL for anticardiolipin antibody positivity, but no prior VTE, has been maintained on aspirin through pregnancy. 12/27/18 U/S EFW 7#6 c/w 86%ile. Minimal change overnight with cervidil, which was removed early due to tachysystole. GBS neg. 
 
- needs continued ripening, reviewed options of low dose pitocin vs. Cervical balloon placement, mutual decision made to proceed with cervical balloon - placed double balloon without difficulty with 60cc/60cc 
- will continue low dose pitocin in addition 
- reviewed possible prolonged IOL with patient who understands, all questions were answered 
- will plan to restart ASA postpartum x 6 weeks

## 2019-01-15 ENCOUNTER — ANESTHESIA EVENT (OUTPATIENT)
Dept: LABOR AND DELIVERY | Age: 34
End: 2019-01-15
Payer: COMMERCIAL

## 2019-01-15 ENCOUNTER — ANESTHESIA (OUTPATIENT)
Dept: LABOR AND DELIVERY | Age: 34
End: 2019-01-15
Payer: COMMERCIAL

## 2019-01-15 LAB
ALBUMIN SERPL-MCNC: 2.3 G/DL (ref 3.5–5)
ALBUMIN/GLOB SERPL: 0.7 {RATIO} (ref 1.1–2.2)
ALP SERPL-CCNC: 131 U/L (ref 45–117)
ALT SERPL-CCNC: 15 U/L (ref 12–78)
ANION GAP SERPL CALC-SCNC: 8 MMOL/L (ref 5–15)
AST SERPL-CCNC: 30 U/L (ref 15–37)
BILIRUB SERPL-MCNC: 0.3 MG/DL (ref 0.2–1)
BUN SERPL-MCNC: 10 MG/DL (ref 6–20)
BUN/CREAT SERPL: 11 (ref 12–20)
CALCIUM SERPL-MCNC: 8.3 MG/DL (ref 8.5–10.1)
CHLORIDE SERPL-SCNC: 107 MMOL/L (ref 97–108)
CO2 SERPL-SCNC: 23 MMOL/L (ref 21–32)
CREAT SERPL-MCNC: 0.89 MG/DL (ref 0.55–1.02)
ERYTHROCYTE [DISTWIDTH] IN BLOOD BY AUTOMATED COUNT: 20.6 % (ref 11.5–14.5)
GLOBULIN SER CALC-MCNC: 3.2 G/DL (ref 2–4)
GLUCOSE SERPL-MCNC: 69 MG/DL (ref 65–100)
HCT VFR BLD AUTO: 37.5 % (ref 35–47)
HGB BLD-MCNC: 11.9 G/DL (ref 11.5–16)
LDH SERPL L TO P-CCNC: 218 U/L (ref 81–246)
MCH RBC QN AUTO: 26.5 PG (ref 26–34)
MCHC RBC AUTO-ENTMCNC: 31.7 G/DL (ref 30–36.5)
MCV RBC AUTO: 83.5 FL (ref 80–99)
NRBC # BLD: 0 K/UL (ref 0–0.01)
NRBC BLD-RTO: 0 PER 100 WBC
PLATELET # BLD AUTO: 173 K/UL (ref 150–400)
PMV BLD AUTO: 12.3 FL (ref 8.9–12.9)
POTASSIUM SERPL-SCNC: 3.8 MMOL/L (ref 3.5–5.1)
PROT SERPL-MCNC: 5.5 G/DL (ref 6.4–8.2)
RBC # BLD AUTO: 4.49 M/UL (ref 3.8–5.2)
SODIUM SERPL-SCNC: 138 MMOL/L (ref 136–145)
URATE SERPL-MCNC: 6.4 MG/DL (ref 2.6–6)
WBC # BLD AUTO: 16 K/UL (ref 3.6–11)

## 2019-01-15 PROCEDURE — 74011250637 HC RX REV CODE- 250/637: Performed by: OBSTETRICS & GYNECOLOGY

## 2019-01-15 PROCEDURE — 75410000002 HC LABOR FEE PER 1 HR

## 2019-01-15 PROCEDURE — 77030028565 HC CATH CERV RIPNG BLN COOK -B

## 2019-01-15 PROCEDURE — 74011250637 HC RX REV CODE- 250/637: Performed by: ADVANCED PRACTICE MIDWIFE

## 2019-01-15 PROCEDURE — 75410000000 HC DELIVERY VAGINAL/SINGLE

## 2019-01-15 PROCEDURE — 36415 COLL VENOUS BLD VENIPUNCTURE: CPT

## 2019-01-15 PROCEDURE — A4300 CATH IMPL VASC ACCESS PORTAL: HCPCS

## 2019-01-15 PROCEDURE — 74011000250 HC RX REV CODE- 250

## 2019-01-15 PROCEDURE — 76060000078 HC EPIDURAL ANESTHESIA

## 2019-01-15 PROCEDURE — 74011250636 HC RX REV CODE- 250/636: Performed by: OBSTETRICS & GYNECOLOGY

## 2019-01-15 PROCEDURE — 80053 COMPREHEN METABOLIC PANEL: CPT

## 2019-01-15 PROCEDURE — 74011250636 HC RX REV CODE- 250/636: Performed by: ADVANCED PRACTICE MIDWIFE

## 2019-01-15 PROCEDURE — 75410000003 HC RECOV DEL/VAG/CSECN EA 0.5 HR

## 2019-01-15 PROCEDURE — 00HU33Z INSERTION OF INFUSION DEVICE INTO SPINAL CANAL, PERCUTANEOUS APPROACH: ICD-10-PCS | Performed by: ANESTHESIOLOGY

## 2019-01-15 PROCEDURE — 65410000002 HC RM PRIVATE OB

## 2019-01-15 PROCEDURE — 0KQM0ZZ REPAIR PERINEUM MUSCLE, OPEN APPROACH: ICD-10-PCS | Performed by: OBSTETRICS & GYNECOLOGY

## 2019-01-15 PROCEDURE — 85027 COMPLETE CBC AUTOMATED: CPT

## 2019-01-15 PROCEDURE — 74011250636 HC RX REV CODE- 250/636: Performed by: ANESTHESIOLOGY

## 2019-01-15 PROCEDURE — 84550 ASSAY OF BLOOD/URIC ACID: CPT

## 2019-01-15 PROCEDURE — 77030014125 HC TY EPDRL BBMI -B: Performed by: ANESTHESIOLOGY

## 2019-01-15 PROCEDURE — 77030011943

## 2019-01-15 PROCEDURE — 3E0R3BZ INTRODUCTION OF ANESTHETIC AGENT INTO SPINAL CANAL, PERCUTANEOUS APPROACH: ICD-10-PCS | Performed by: ANESTHESIOLOGY

## 2019-01-15 PROCEDURE — 83615 LACTATE (LD) (LDH) ENZYME: CPT

## 2019-01-15 RX ORDER — FENTANYL/BUPIVACAINE/NS/PF 2-1250MCG
1-16 PREFILLED PUMP RESERVOIR EPIDURAL CONTINUOUS
Status: DISCONTINUED | OUTPATIENT
Start: 2019-01-15 | End: 2019-01-17 | Stop reason: HOSPADM

## 2019-01-15 RX ORDER — NALOXONE HYDROCHLORIDE 0.4 MG/ML
0.4 INJECTION, SOLUTION INTRAMUSCULAR; INTRAVENOUS; SUBCUTANEOUS AS NEEDED
Status: DISCONTINUED | OUTPATIENT
Start: 2019-01-15 | End: 2019-01-17 | Stop reason: HOSPADM

## 2019-01-15 RX ORDER — IBUPROFEN 400 MG/1
800 TABLET ORAL EVERY 8 HOURS
Status: DISCONTINUED | OUTPATIENT
Start: 2019-01-15 | End: 2019-01-17 | Stop reason: HOSPADM

## 2019-01-15 RX ORDER — ONDANSETRON 4 MG/1
4 TABLET, ORALLY DISINTEGRATING ORAL
Status: DISCONTINUED | OUTPATIENT
Start: 2019-01-15 | End: 2019-01-17 | Stop reason: HOSPADM

## 2019-01-15 RX ORDER — MINERAL OIL
OIL (ML) ORAL
Status: DISPENSED
Start: 2019-01-15 | End: 2019-01-16

## 2019-01-15 RX ORDER — AMMONIA 15 % (W/V)
1 AMPUL (EA) INHALATION AS NEEDED
Status: DISCONTINUED | OUTPATIENT
Start: 2019-01-15 | End: 2019-01-17 | Stop reason: HOSPADM

## 2019-01-15 RX ORDER — BUPIVACAINE HYDROCHLORIDE 2.5 MG/ML
25 INJECTION, SOLUTION EPIDURAL; INFILTRATION; INTRACAUDAL ONCE
Status: DISPENSED | OUTPATIENT
Start: 2019-01-15 | End: 2019-01-15

## 2019-01-15 RX ORDER — DIPHENHYDRAMINE HCL 25 MG
25 CAPSULE ORAL
Status: DISCONTINUED | OUTPATIENT
Start: 2019-01-15 | End: 2019-01-17 | Stop reason: HOSPADM

## 2019-01-15 RX ORDER — ACETAMINOPHEN 325 MG/1
650 TABLET ORAL
Status: DISCONTINUED | OUTPATIENT
Start: 2019-01-15 | End: 2019-01-17 | Stop reason: HOSPADM

## 2019-01-15 RX ORDER — NALOXONE HYDROCHLORIDE 0.4 MG/ML
0.4 INJECTION, SOLUTION INTRAMUSCULAR; INTRAVENOUS; SUBCUTANEOUS AS NEEDED
Status: DISCONTINUED | OUTPATIENT
Start: 2019-01-15 | End: 2019-01-15 | Stop reason: HOSPADM

## 2019-01-15 RX ORDER — SODIUM CHLORIDE 0.9 % (FLUSH) 0.9 %
5-40 SYRINGE (ML) INJECTION EVERY 8 HOURS
Status: DISCONTINUED | OUTPATIENT
Start: 2019-01-15 | End: 2019-01-17 | Stop reason: HOSPADM

## 2019-01-15 RX ORDER — ATROPINE SULFATE 0.4 MG/ML
0.4 INJECTION, SOLUTION ENDOTRACHEAL; INTRAMEDULLARY; INTRAMUSCULAR; INTRAVENOUS; SUBCUTANEOUS ONCE
Status: DISPENSED | OUTPATIENT
Start: 2019-01-15 | End: 2019-01-15

## 2019-01-15 RX ORDER — SODIUM CHLORIDE, SODIUM LACTATE, POTASSIUM CHLORIDE, CALCIUM CHLORIDE 600; 310; 30; 20 MG/100ML; MG/100ML; MG/100ML; MG/100ML
100 INJECTION, SOLUTION INTRAVENOUS CONTINUOUS
Status: DISCONTINUED | OUTPATIENT
Start: 2019-01-15 | End: 2019-01-15 | Stop reason: HOSPADM

## 2019-01-15 RX ORDER — HYDROCORTISONE ACETATE PRAMOXINE HCL 2.5; 1 G/100G; G/100G
CREAM TOPICAL AS NEEDED
Status: DISCONTINUED | OUTPATIENT
Start: 2019-01-15 | End: 2019-01-17 | Stop reason: HOSPADM

## 2019-01-15 RX ORDER — LIDOCAINE HYDROCHLORIDE AND EPINEPHRINE 15; 5 MG/ML; UG/ML
INJECTION, SOLUTION EPIDURAL
Status: COMPLETED | OUTPATIENT
Start: 2019-01-15 | End: 2019-01-15

## 2019-01-15 RX ORDER — SIMETHICONE 80 MG
80 TABLET,CHEWABLE ORAL
Status: DISCONTINUED | OUTPATIENT
Start: 2019-01-15 | End: 2019-01-17 | Stop reason: HOSPADM

## 2019-01-15 RX ORDER — OXYCODONE AND ACETAMINOPHEN 5; 325 MG/1; MG/1
1 TABLET ORAL
Status: DISCONTINUED | OUTPATIENT
Start: 2019-01-15 | End: 2019-01-17 | Stop reason: HOSPADM

## 2019-01-15 RX ORDER — GUAIFENESIN 100 MG/5ML
81 LIQUID (ML) ORAL DAILY
Status: DISCONTINUED | OUTPATIENT
Start: 2019-01-16 | End: 2019-01-17 | Stop reason: HOSPADM

## 2019-01-15 RX ORDER — LIDOCAINE HYDROCHLORIDE 10 MG/ML
INJECTION INFILTRATION; PERINEURAL
Status: DISPENSED
Start: 2019-01-15 | End: 2019-01-16

## 2019-01-15 RX ORDER — SODIUM CHLORIDE 0.9 % (FLUSH) 0.9 %
5-40 SYRINGE (ML) INJECTION AS NEEDED
Status: DISCONTINUED | OUTPATIENT
Start: 2019-01-15 | End: 2019-01-17 | Stop reason: HOSPADM

## 2019-01-15 RX ORDER — DOCUSATE SODIUM 100 MG/1
100 CAPSULE, LIQUID FILLED ORAL
Status: DISCONTINUED | OUTPATIENT
Start: 2019-01-15 | End: 2019-01-17 | Stop reason: HOSPADM

## 2019-01-15 RX ORDER — EPHEDRINE SULFATE 50 MG/ML
10 INJECTION, SOLUTION INTRAVENOUS
Status: DISCONTINUED | OUTPATIENT
Start: 2019-01-15 | End: 2019-01-15 | Stop reason: HOSPADM

## 2019-01-15 RX ORDER — FENTANYL CITRATE 50 UG/ML
100 INJECTION, SOLUTION INTRAMUSCULAR; INTRAVENOUS ONCE
Status: DISCONTINUED | OUTPATIENT
Start: 2019-01-15 | End: 2019-01-15 | Stop reason: HOSPADM

## 2019-01-15 RX ORDER — BUPIVACAINE HYDROCHLORIDE 2.5 MG/ML
INJECTION, SOLUTION EPIDURAL; INFILTRATION; INTRACAUDAL
Status: COMPLETED | OUTPATIENT
Start: 2019-01-15 | End: 2019-01-15

## 2019-01-15 RX ORDER — HYDROCORTISONE 1 %
CREAM (GRAM) TOPICAL AS NEEDED
Status: DISCONTINUED | OUTPATIENT
Start: 2019-01-15 | End: 2019-01-17 | Stop reason: HOSPADM

## 2019-01-15 RX ORDER — OXYTOCIN/RINGER'S LACTATE 20/1000 ML
125-500 PLASTIC BAG, INJECTION (ML) INTRAVENOUS ONCE
Status: ACTIVE | OUTPATIENT
Start: 2019-01-15 | End: 2019-01-16

## 2019-01-15 RX ADMIN — IBUPROFEN 800 MG: 400 TABLET, FILM COATED ORAL at 21:27

## 2019-01-15 RX ADMIN — SODIUM CHLORIDE, SODIUM LACTATE, POTASSIUM CHLORIDE, AND CALCIUM CHLORIDE 125 ML/HR: 600; 310; 30; 20 INJECTION, SOLUTION INTRAVENOUS at 07:13

## 2019-01-15 RX ADMIN — OXYTOCIN-SODIUM CHLORIDE 0.9% IV SOLN 30 UNIT/500ML 2 MILLI-UNITS/MIN: 30-0.9/5 SOLUTION at 07:13

## 2019-01-15 RX ADMIN — BUPIVACAINE HYDROCHLORIDE 5 ML: 2.5 INJECTION, SOLUTION EPIDURAL; INFILTRATION; INTRACAUDAL at 09:29

## 2019-01-15 RX ADMIN — Medication 10 ML/HR: at 09:32

## 2019-01-15 RX ADMIN — MISOPROSTOL 25 MCG: 100 TABLET ORAL at 02:57

## 2019-01-15 RX ADMIN — LIDOCAINE HYDROCHLORIDE AND EPINEPHRINE 5 ML: 15; 5 INJECTION, SOLUTION EPIDURAL at 09:29

## 2019-01-15 NOTE — ANESTHESIA PROCEDURE NOTES
Epidural Block Performed by: Claudetta Sin, MD 
Authorized by: Claudetta Sin, MD  
 
Pre-Procedure Indication: labor epidural   
Preanesthetic Checklist: patient identified, risks and benefits discussed, anesthesia consent, site marked, patient being monitored, timeout performed and anesthesia consent Epidural:  
Patient position:  Seated Prep region:  Lumbar Prep: Chlorhexidine Location:  L2-3 Needle and Epidural Catheter:  
Needle Type:  Tuohy Needle Gauge:  17 G Injection Technique:  Loss of resistance using saline Attempts:  1 Catheter Size:  19 G Events: no blood with aspiration, no cerebrospinal fluid with aspiration, no paresthesia and negative aspiration test   
Test Dose:  Bupivacaine 0.25% and negative Assessment:  
Catheter Secured:  Tegaderm and tape Insertion:  Uncomplicated Patient tolerance:  Patient tolerated the procedure well with no immediate complications

## 2019-01-15 NOTE — PROGRESS NOTES
0735-bedside report from YANI Lynn 
0745-Jeremy here to see pt 
0750-Walchers maneuver 
0758-Up to void 0800-Knee chest 
0810-up to walk halls 0840-on birthing ball, coping well 
0855-pt called out, SROM, clear fluid, pt sitting on birthing ball 
0905-requesting epidural 
0927-epidural placed Dr Leopold Gerlach 0955-pt resting with peanut ball, notified Dr Franklin Andrade of pt progress 1106-SVE Raju. 5/80/-2 
1203-straight cath done, 150 cc, pt resting with peanut ball 1400-Pt feeling pressure and sometimes pain, pressed PCEA, SVE 10/100/0 
1400-sidelying pelvic release left side 1410-sidelying pelvic release right side 1419-straight cath done 100cc 1420-tburg with peanut ball, Dr Franklin Andrade aware 
1500-high fowlers 1510-knee chest 
1545-more rectal pressure noted, Turned back to left side. Large amt urine and fluid leaking, bed linen and gown changed, attempt to push in this position pt unable 1600-Pushing started Franklin Andrade aware 1730- baby girl, Dr Franklin Andrade, see delivery summary 1744-baby hooked up to maternal pulse and pulse ox cable while doing skin to skin 1757-baby off maternal pulse at this time, Baby O2 sat 100%RA 
1802-breastfeeding well 1840-straight cath done due to difficult emptying bladder during labor. [address], [address] -spoke with Dr Franklin Andrade, orders rec'd, bedside report to Zeferino Tapia RN 
-spoke with Dr Ajay Rodas to update

## 2019-01-15 NOTE — PROGRESS NOTES
[place] of Care Note Pt seen and examined and electronic chart was reviewed. Ms. Twan Rogers is a 35 y/o  with IUP at 39 1/7 wks. She is an IOL for anticardiolipin antibody positivity. She has no h/o prior VTE however has been on ASA during the course of this pregnancy. EFW  was 7lbs 6 oz (86th%ile). GBS negative status. She presented for cervical ripening on  with cervidil. The cervidil was pulled early due to tachysystole. She received a boyd balloon and low dose pitocin during the course of the day yesterday. Cervix was noted to be 3/40/-2 after boyd was removed. She then received 2 doses of cytotec overnight. Cervical exam this am was 4/50/-2. She is currently receiving pitocin for labor augmentation. FHT category 1 with baseline 150, moderate variability, +accels, -decels. Ctx q2-3 minutes on toco.  
 
Discussed plan of care with patient. Will recheck cervix in 2 hours. Plan AROM when amenable. Patient considering epidural for pain management but would like to defer until after next cervical check.

## 2019-01-15 NOTE — L&D DELIVERY NOTE
Delivery Summary        Viable female infant delivered spontaneously. Head delivered atraumatically. No nuchal cord noted. Shoulders and body delivered w/o complications. Infant's mouth and nares were bulb suctioned. Cord clamped x2 and cut. Infant placed in warmer under care of nursing staff in presence. Placenta was delivered with controlled cord traction at 1732 and upon inspection was found to be intact with 3vc. Bimanual massage and IV pitocin bolus were administered. Fundus noted to be firm. 2nd deg perineal lac repaired in routine fashion. No complications. Patient: Keith Burkitt MRN: [id number]  SSN: [id number]    YOB: 1985  Age: 34 y.o. Sex: female       Information for the patient's :  Ronnie Billingsley [[id number]]       Labor Events:    Labor: No   Rupture Date: 1/15/2019   Rupture Time: 8:55 AM   Rupture Type SROM   Amniotic Fluid Volume:      Amniotic Fluid Description: Clear None   Induction: Ortega Bulb (balloon); Oxytocin;Prostaglandins       Augmentation: None   Labor Events:       Cervical Ripenin2019 4:15 PM Cervidil     Delivery Events:  Episiotomy: None   Laceration(s): Second degree perineal     Repaired: Yes    Number of Repair Packets:     Suture Type and Size: Rapide 3-0     Estimated Blood Loss (ml): 250ml       Delivery Date: 1/15/2019    Delivery Time: 5:30 PM  Delivery Type: Vaginal, Spontaneous  Sex:  Female     Gestational Age: 36w3d   Delivery Clinician:  Maria De Jesus Dhillon  Living Status: Living   Delivery Location: L&D            APGARS  One minute Five minutes Ten minutes   Skin color: 0   1   1     Heart rate: 2   2   2     Grimace: 2   2   2     Muscle tone: 1   1   2     Breathin   2   2     Totals: 6   8   9         Presentation: Vertex    Position:        Resuscitation Method:        Meconium Stained:        Cord Information: 3 Vessels  Complications: None  Cord around:    Delayed cord clamping?  Yes  Cord clamped date/time:1/15/2019  5:30 PM  Disposition of Cord Blood: Lab    Blood Gases Sent?: Yes    Placenta:  Date/Time: 1/15/2019  5:32 PM  Removal: Spontaneous      Appearance: Normal      Measurements:  Birth Weight:        Birth Length:        Head Circumference:        Chest Circumference:       Abdominal Girth: Other Providers:   Chetan Bustamante;, Obstetrician;Primary Nurse;Primary  Nurse           Group B Strep:   Lab Results   Component Value Date/Time    GrBStrep, External Negative 2018     Information for the patient's :  Carol White [[id number]]   No results found for: ABORH, PCTABR, PCTDIG, BILI, ABORHEXT, ABORH    No results for input(s): PCO2CB, PO2CB, HCO3I, SO2I, IBD, PTEMPI, SPECTI, PHICB, ISITE, IDEV, IALLEN in the last 72 hours.

## 2019-01-15 NOTE — PROGRESS NOTES
1940 Bedside shift change report given to YANI Lynn RN (oncoming nurse) by Gregorio Rodríguez RN (offgoing nurse). Report included the following information SBAR, Kardex, Intake/Output, MAR and Recent Results. [address] at bedside. Ortega balloon removed. SVE 3-4/50/-3. Plan to rest for two hours. 2320 25mcg of vaginal cytotec placed by Tiff Jasmine. [address] at bedside. SVE 4/50/-3. 25mcg of cytotec placed vaginally. 0630 Patient nehemiah but resting comfortably. Plan to start pitocin at 0700. 
 
0730 Bedside shift change report given to [id number] Bhupinder (oncoming nurse) by Gianna Fraga RN (offgoing nurse). Report included the following information SBAR, Kardex, Intake/Output, MAR and Recent Results.

## 2019-01-15 NOTE — PROGRESS NOTES
Labor Note S: Pt noted to be complete, feeling more rectal pressure. O:  
Visit Vitals /86 Pulse 88 Temp 98.1 °F (36.7 °C) Resp 16 Ht 5' 4\" (1.626 m) Wt 91.2 kg (201 lb) SpO2 94% Breastfeeding? No  
BMI 34.50 kg/m² Patient Vitals for the past 4 hrs: Mode Fetal Heart Rate Fetal Activity Variability Decelerations Accelerations RN Reviewed Strip? 01/15/19 1635 External 150     Yes  
01/15/19 1630 External 150 Present 6-25 BPM None Yes Yes  
01/15/19 1624 External 150     Yes  
01/15/19 1620 External 145     Yes  
01/15/19 1616       Yes  
01/15/19 1614 External 150 Present 6-25 BPM None No Yes  
01/15/19 1608 External 145     Yes  
01/15/19 1607 External 145     Yes  
01/15/19 1600 External 145 Present 6-25 BPM Early No Yes  
01/15/19 1545 External 145       
01/15/19 1526 External 140 Present 6-25 BPM None No Yes  
01/15/19 1515 External 125     Yes  
01/15/19 1500 External 140 Present 6-25 BPM None No Yes  
01/15/19 1441 External 145     Yes  
01/15/19 1431 External 150 Present 6-25 BPM None No Yes  
01/15/19 1419 External 135     Yes  
01/15/19 1400 External 145 Present 6-25 BPM None Yes Yes  
01/15/19 1345 External 145     Yes  
01/15/19 1330 External 140 Present 6-25 BPM Variable No Yes  
01/15/19 1315 External 140     Yes FHTs- category 1 (baseline 150, moderate variability, +accels, + early decels) [place]: every 2 min on 5mu pitocin. Gen - NAD Resp - nl effort Abd - gravid, EFW 8.5# Cervix - C/C/+1 LE - 1+ edema A/P: 33 y/o G1 at 39w1d undergoing IOL for anticardiolipin antibody positivity, but no prior VTE, has been maintained on aspirin through pregnancy. GBS neg. 
 
-Maternal well-being: VSS, comfortable s/p epidural 
-Fetal well-being: category 1 tracing 
-Labor: pushing started at 1600

## 2019-01-15 NOTE — PROGRESS NOTES
Labor Progress Note Patient seen, fetal heart rate and contraction pattern evaluated. Doing well. Feeling UCs mildly. Physical Exam: 
Cervical Exam: 3-4/50/-3 Membranes:  Intact Uterine Activity: Frequency: 3-4 min, mild Fetal Heart Rate: Reactive, 140 moderate variability Accelerations: yes Decelerations: none Assessment/Plan: 
IUP 39 wks Reassuring FWB Balloon removed 
plan for vaginal cytotec Roberto Wu CNM

## 2019-01-15 NOTE — PROGRESS NOTES
Labor Note S: Pt SROM'd at [facility]. Is now comfortable s/p epidural.  
 
O:  
Visit Vitals /73 Pulse 88 Temp 97.8 °F (36.6 °C) Resp 16 Ht 5' 4\" (1.626 m) Wt 91.2 kg (201 lb) SpO2 94% Breastfeeding? No  
BMI 34.50 kg/m² Patient Vitals for the past 4 hrs: Mode Fetal Heart Rate Fetal Activity Variability Decelerations Accelerations RN Reviewed Strip? 01/15/19 1026 External 145 Present 6-25 BPM None Yes Yes  
01/15/19 1011 External 140     Yes  
01/15/19 1000 External 145  6-25 BPM None No Yes  
01/15/19 0944 External 145 Present 6-25 BPM None  Yes  
01/15/19 0943 External 145 Present 6-25 BPM None No Yes  
01/15/19 0930 External 150  6-25 BPM None No Yes  
01/15/19 0921 External 150 Present 6-25 BPM Variable No Yes  
01/15/19 0913 External 150     Yes  
01/15/19 0858 External 140 Present 6-25 BPM None Yes Yes  
01/15/19 0841 External 145 Present 6-25 BPM None No Yes  
01/15/19 0829 External 145 Present 6-25 BPM None No Yes  
01/15/19 0811 External 150       
01/15/19 0757 External 146 Present 6-25 BPM Variable No Yes  
01/15/19 0730 External 145 Present 6-25 BPM None Yes Yes  
01/15/19 0715 External 145     Yes FHTs- category 1 (baseline 150, moderate variability, +accels, -decels) [place]: every 2-3 min on 6mu pitocin. Gen - NAD Resp - nl effort Abd - gravid, EFW 8.5# Cervix - 5/80/-2 LE - trace edema A/P: 33 y/o G1 at 39w1d undergoing IOL for anticardiolipin antibody positivity, but no prior VTE, has been maintained on aspirin through pregnancy. GBS neg. 
 
-Maternal well-being: VSS, comfortable s/p epidural 
-Fetal well-being: category 1 tracing 
-Labor: Continue pitocin for labor augmentation, will reassess in 2-3 hours

## 2019-01-15 NOTE — PROGRESS NOTES
Labor Progress Note Patient seen, fetal heart rate and contraction pattern evaluated. Resting. Feels Ucs have spaced. Physical Exam: 
Cervical Exam: not examined Membranes:  Intact Uterine Activity: Frequency: 3-5 mild Fetal Heart Rate: Reactive 155,moderate variability Accelerations: yes Decelerations:none Assessment/Plan: 
IUP 39 wks Reassuring FWB Cytotec 25mcg placed vaginally Kristi Strickland CNM

## 2019-01-15 NOTE — PROGRESS NOTES
Labor Progress Note Patient seen, fetal heart rate and contraction pattern evaluated. Feeling UCs stronger. Physical Exam: 
Cervical Exam: not examined Membranes:  Intact Uterine Activity: Frequency:2-6 min Fetal Heart Rate: Reactive,140 moderate variability Accelerations: yes Decelerations: none Assessment/Plan: 
IUP 39.1 Reassuring FWB IOL for anticardiloipin antibody Pitocin at 0700 Inessa Platt CNM

## 2019-01-16 PROCEDURE — 74011250637 HC RX REV CODE- 250/637: Performed by: OBSTETRICS & GYNECOLOGY

## 2019-01-16 PROCEDURE — 65410000002 HC RM PRIVATE OB

## 2019-01-16 RX ADMIN — IBUPROFEN 800 MG: 400 TABLET, FILM COATED ORAL at 23:34

## 2019-01-16 RX ADMIN — IBUPROFEN 800 MG: 400 TABLET, FILM COATED ORAL at 07:04

## 2019-01-16 RX ADMIN — IBUPROFEN 800 MG: 400 TABLET, FILM COATED ORAL at 15:06

## 2019-01-16 RX ADMIN — ASPIRIN 81 MG CHEWABLE TABLET 81 MG: 81 TABLET CHEWABLE at 09:19

## 2019-01-16 RX ADMIN — DOCUSATE SODIUM 100 MG: 100 CAPSULE, LIQUID FILLED ORAL at 09:25

## 2019-01-16 NOTE — PROGRESS NOTES
1950 Bedside and Verbal shift change report given to Yony RN (oncoming nurse) by Silvestre Kocher RN (offgoing nurse). Report included the following information SBAR, Kardex, Intake/Output, MAR, Accordion, Recent Results and Med Rec Status. 2015 Dr Bong Gonzalez at nurse's station, aware of recent BP. Per MD, patient may go out to [facility]U now, and do not have to wait for labs to come back. 2100 TRANSFER - OUT REPORT: 
 
Verbal report given to [facility] RN(name) on [employer]  being transferred to [facility](unit) for routine progression of care Report consisted of patients Situation, Background, Assessment and  
Recommendations(SBAR). Information from the following report(s) SBAR, Kardex, Intake/Output, MAR, Accordion, Recent Results and Med Rec Status was reviewed with the receiving nurse. Lines:  
Peripheral IV 01/13/19 Left;Posterior Hand (Active) Site Assessment Clean, dry, & intact 1/15/2019  8:06 PM  
Phlebitis Assessment 0 1/15/2019  8:06 PM  
Infiltration Assessment 0 1/15/2019  8:06 PM  
Dressing Status Clean, dry, & intact 1/15/2019  8:06 PM  
Dressing Type Transparent;Tape 1/15/2019  8:06 PM  
Hub Color/Line Status Pink;Capped 1/15/2019  8:06 PM  
Alcohol Cap Used Yes 1/15/2019  8:06 PM  
  
 
Opportunity for questions and clarification was provided. Patient transported with: 
 Registered Nurse

## 2019-01-16 NOTE — LACTATION NOTE
This note was copied from a baby's chart. Initial Lactation Consultation: Infant born last evening vaginally to a  mom at 44 1/7 weeks gestation. Mom states she has easily expressed colostrum. Infant just completed a feeding at the time of my visit; mom will call for latch verification at next feed. [place] behaviors reviewed, Very sleepy in first 24 hours, mother must wake baby for feedings, offer hand expressed drops, baby usually will respond and feed vigorously 6-8 times in the first day, but is important to offer 8-12 times,  After baby wakes from deep sleep usually on the 2nd or 3rd day a new behavior pattern follows. Frequent feeding during this brief behavioral phase preceeding milk transition is called cluster feeding. Typical  behavior: baby becomes vigorous at the breast and wants to feed frequently- every 1-2 hours for several feedings. This is the normal process by which the baby demands his/her supply. This type of frequent feeding is the stimulation which causes lactogenesis II (milk coming in). Feeding Plan: Mother will keep baby skin to skin as often as possible, feed on demand, 8-12x/day , respond to feeding cues, obtain latch, listen for audible swallowing, be aware of signs of oxytocin release/ cramping,thirst,sleepiness while breastfeeding, offer both breasts,and will not limit feedings. Mother agrees to utilize breast massage while nursing to facilitate lactogenesis.

## 2019-01-16 NOTE — PROGRESS NOTES
Bedside and Verbal shift change report given to PAM Patel rn (oncoming nurse) by Gloria Smith. Zacarias Tan RN (offgoing nurse). Report included the following information SBAR.

## 2019-01-16 NOTE — ROUTINE PROCESS
TRANSFER - IN REPORT: 
 
Verbal report received from NICOLAS Turcios RN(name) on [address]  being received from L&D(unit) for routine progression of care Report consisted of patients Situation, Background, Assessment and  
Recommendations(SBAR). Information from the following report(s) SBAR, Kardex, Intake/Output, MAR, Accordion and Recent Results was reviewed with the receiving nurse. Opportunity for questions and clarification was provided. Assessment completed upon patients arrival to unit and care assumed. 2230: pt passed egress test, pt up to bathroom with assistance, pt voided, check void by 0430, chas care completed.

## 2019-01-16 NOTE — PROGRESS NOTES
Post-Partum Day Number 1 Progress Note [address] Assessment: Doing well, post partum day 1 Plan: 1. Continue routine postpartum and perineal care as well as maternal education. 2. HTN- transient, mildly elevated BPs (110s-140s/60s-90s); last several normal 
- no HA/visual changes 
- +RUQ pain- starts at uterus, suspect MSK - pt to notify us if changes/worsens 
- cont to monitor 3. Anticardiolipin ab - on ASA 81mg daily, cont 6w pp Information for the patient's :  Vikas Michael [[id number]] Vaginal, Spontaneous Patient doing well without significant complaint. Voiding without difficulty, normal lochia. Vitals: 
Visit Vitals /77 (BP 1 Location: Left arm, BP Patient Position: At rest) Pulse 86 Temp 98.1 °F (36.7 °C) Resp 16 Ht 5' 4\" (1.626 m) Wt 91.2 kg (201 lb) Comment: 51 pound gain SpO2 95% Breastfeeding? Unknown BMI 34.50 kg/m² Temp (24hrs), Av.6 °F (37 °C), Min:98.1 °F (36.7 °C), Max:99.1 °F (37.3 °C) Exam:   Patient without distress. Abdomen soft, fundus firm, mildly tender along entire R side - no guard/rebound Lower extremities are symmetric without tenderness, cords or erythema. Labs:  
 
Lab Results Component Value Date/Time  WBC 16.0 (H) 01/15/2019 07:59 PM  
 WBC 10.7 2019 07:21 PM  
 WBC 11.6 (H) 2018 02:45 AM  
 WBC 7.2 07/10/2017 01:01 PM  
 WBC 8.6 2017 01:49 PM  
 HGB 11.9 01/15/2019 07:59 PM  
 HGB 11.3 (L) 2019 07:21 PM  
 HGB 10.8 (L) 2018 02:45 AM  
 HGB 12.1 07/10/2017 01:01 PM  
 HGB 12.7 2017 01:49 PM  
 HCT 37.5 01/15/2019 07:59 PM  
 HCT 36.4 2019 07:21 PM  
 HCT 35.6 2018 02:45 AM  
 HCT 37.1 07/10/2017 01:01 PM  
 HCT 39.3 2017 01:49 PM  
 PLATELET 756  07:59 PM  
 PLATELET 295  07:21 PM  
 PLATELET 783  02:45 AM  
 PLATELET 573  01:01 PM  
 PLATELET 513 75/41/8078 01:49 PM  
 
 
Recent Results (from the past 24 hour(s)) CBC W/O DIFF Collection Time: 01/15/19  7:59 PM  
Result Value Ref Range WBC 16.0 (H) 3.6 - 11.0 K/uL  
 RBC 4.49 3.80 - 5.20 M/uL  
 HGB 11.9 11.5 - 16.0 g/dL HCT 37.5 35.0 - 47.0 % MCV 83.5 80.0 - 99.0 FL  
 MCH 26.5 26.0 - 34.0 PG  
 MCHC 31.7 30.0 - 36.5 g/dL RDW 20.6 (H) 11.5 - 14.5 % PLATELET 564 765 - 916 K/uL MPV 12.3 8.9 - 12.9 FL  
 NRBC 0.0 0  WBC ABSOLUTE NRBC 0.00 0.00 - 0.01 K/uL METABOLIC PANEL, COMPREHENSIVE Collection Time: 01/15/19  7:59 PM  
Result Value Ref Range Sodium 138 136 - 145 mmol/L Potassium 3.8 3.5 - 5.1 mmol/L Chloride 107 97 - 108 mmol/L  
 CO2 23 21 - 32 mmol/L Anion gap 8 5 - 15 mmol/L Glucose 69 65 - 100 mg/dL BUN 10 6 - 20 MG/DL Creatinine 0.89 0.55 - 1.02 MG/DL  
 BUN/Creatinine ratio 11 (L) 12 - 20 GFR est AA >60 >60 ml/min/1.73m2 GFR est non-AA >60 >60 ml/min/1.73m2 Calcium 8.3 (L) 8.5 - 10.1 MG/DL Bilirubin, total 0.3 0.2 - 1.0 MG/DL  
 ALT (SGPT) 15 12 - 78 U/L  
 AST (SGOT) 30 15 - 37 U/L Alk. phosphatase 131 (H) 45 - 117 U/L Protein, total 5.5 (L) 6.4 - 8.2 g/dL Albumin 2.3 (L) 3.5 - 5.0 g/dL Globulin 3.2 2.0 - 4.0 g/dL A-G Ratio 0.7 (L) 1.1 - 2.2 LD Collection Time: 01/15/19  7:59 PM  
Result Value Ref Range  81 - 246 U/L  
URIC ACID Collection Time: 01/15/19  7:59 PM  
Result Value Ref Range  Uric acid 6.4 (H) 2.6 - 6.0 MG/DL

## 2019-01-17 VITALS
RESPIRATION RATE: 16 BRPM | WEIGHT: 201 LBS | SYSTOLIC BLOOD PRESSURE: 120 MMHG | HEIGHT: 64 IN | HEART RATE: 76 BPM | TEMPERATURE: 98 F | OXYGEN SATURATION: 95 % | DIASTOLIC BLOOD PRESSURE: 74 MMHG | BODY MASS INDEX: 34.31 KG/M2

## 2019-01-17 PROCEDURE — 74011250637 HC RX REV CODE- 250/637: Performed by: OBSTETRICS & GYNECOLOGY

## 2019-01-17 RX ORDER — GUAIFENESIN 100 MG/5ML
81 LIQUID (ML) ORAL DAILY
Qty: 60 TAB | Refills: 1 | Status: SHIPPED | OUTPATIENT
Start: 2019-01-18

## 2019-01-17 RX ORDER — IBUPROFEN 600 MG/1
800 TABLET ORAL
Qty: 36 TAB | Refills: 2 | Status: SHIPPED | OUTPATIENT
Start: 2019-01-17

## 2019-01-17 RX ADMIN — DOCUSATE SODIUM 100 MG: 100 CAPSULE, LIQUID FILLED ORAL at 08:42

## 2019-01-17 RX ADMIN — ASPIRIN 81 MG CHEWABLE TABLET 81 MG: 81 TABLET CHEWABLE at 08:42

## 2019-01-17 RX ADMIN — IBUPROFEN 800 MG: 400 TABLET, FILM COATED ORAL at 08:09

## 2019-01-17 NOTE — ROUTINE PROCESS
0730: Bedside shift change report given to CLAU Dietrich RN (oncoming nurse) by Nadiya Fried RN (offgoing nurse). Report included the following information SBAR.  
8914: Discharge instructions reviewed with pt and all questions answered. Prescriptions given. Follow up in 6 weeks with Dr. Jackie Read. Pt discharged in wheelchair by volunteers.

## 2019-01-17 NOTE — DISCHARGE INSTRUCTIONS
Vaginal Childbirth: What To Expect At Home    Your Recovery: Your body will slowly heal in the next few weeks. It is easy to get too tired and overwhelmed during the first weeks after your baby is born. Changes in your hormones can shift your mood without warning. You may find it hard to meet the extra demands on your energy and time. Take it easy on yourself. Follow-up care is a key part of your treatment and safety. Be sure to make and go to all appointments, and call your doctor if you are having problems. It's also a good idea to know your test results and keep a list of the medicines you take. How can you care for yourself at home? Vaginal bleeding and cramps  · After delivery, you will have a bloody discharge from the vagina. This will turn pink within a week and then white or yellow after about 10 days. It may last for 2 to 4 weeks or longer, until the uterus has healed. Use pads instead of tampons until you stop bleeding. · Do not worry if you pass some blood clots, as long as they are smaller than a golf ball. If you have a tear or stitches in your vaginal area, change the pad at least every 4 hours to prevent soreness and infection. · You may have cramps for the first few days after childbirth. These are normal and occur as the uterus shrinks to normal size. Take an over-the-counter pain medicine, such as acetaminophen (Tylenol), ibuprofen (Advil, Motrin), or naproxen (Aleve), for cramps. Read and follow all instructions on the label. Do not take aspirin, because it can cause more bleeding. Do not take acetaminophen (Tylenol) and other acetaminophen containing medications (i.e. Percocet) at the same time. Breast fullness  · Your breasts may overfill (engorge) in the first few days after delivery. To help milk flow and to relieve pain, warm your breasts in the shower or by using warm, moist towels before nursing.   · If you are not nursing, do not put warmth on your breasts or touch your breasts. Wear a tight bra or sports bra and use ice until the fullness goes away. This usually takes 2 to 3 days. · Put ice or a cold pack on your breast after nursing to reduce swelling and pain. Put a thin cloth between the ice and your skin. Activity  · Eat a balanced diet. Do not try to lose weight by cutting calories. Keep taking your prenatal vitamins, or take a multivitamin. · Get as much rest as you can. Try to take naps when your baby sleeps during the day. · Get some exercise every day. But do not do any heavy exercise until your doctor says it is okay. · Wait until you are healed (about 4 to 6 weeks) before you have sexual intercourse. Your doctor will tell you when it is okay to have sex. · Talk to your doctor about birth control. You can get pregnant even before your period returns. Also, you can get pregnant while you are breast-feeding. Mental Health  · Many women get the \"baby blues\" during the first few days after childbirth. You may lose sleep, feel irritable, and cry easily. You may feel happy one minute and sad the next. Hormone changes are one cause of these emotional changes. Also, the demands of a new baby, along with visits from relatives or other family needs, add to a mother's stress. The \"baby blues\" often peak around the fourth day. Then they ease up in less than 2 weeks. · If your moodiness or anxiety lasts for more than 2 weeks, or if you feel like life is not worth living, you may have postpartum depression. This is different for each mother. Some mothers with serious depression may worry intensely about their infant's well-being. Others may feel distant from their child. Some mothers might even feel that they might harm their baby. A mother may have signs of paranoia, wondering if someone is watching her. · With all the changes in your life, you may not know if you are depressed.  Pregnancy sometimes causes changes in how you feel that are similar to the symptoms of depression. · Symptoms of depression include:  · Feeling sad or hopeless and losing interest in daily activities. These are the most common symptoms of depression. · Sleeping too much or not enough. · Feeling tired. You may feel as if you have no energy. · Eating too much or too little. · [employer] ([employer]) offers a Warm line; Chat with the Expert phone sessions; Information and Articles about Pregnancy and Postpartum Mood Disorders; Comprehensive List of Free Support Groups; Knowledgeable local coordinators who will offer support, information, and resources; Guide to Resources on [employer]; Calendar of events in the  mood disorders community; Latest News and Research; and [facility] [address] for [employer]. Remember - You are not alone; You are not to blame; With help, you will be well. 6-189-842-PPD(9030). WWW. [employer]PART[employer]. NET   · Writing or talking about death, such as writing suicide notes or talking about guns, knives, or pills. Keep the numbers for these national suicide hotlines: 4-784-690-TALK (9-409.739.2995) and 9-274-VVWAWWR (4-703.628.2692). If you or someone you know talks about suicide or feeling hopeless, get help right away. Constipation and Hemorrhoids  · Drink plenty of fluids, enough so that your urine is light yellow or clear like water. If you have kidney, heart, or liver disease and have to limit fluids, talk with your doctor before you increase the amount of fluids you drink. · Eat plenty of fiber each day. Have a bran muffin or bran cereal for breakfast, and try eating a piece of fruit for a mid-afternoon snack. · For painful, itchy hemorrhoids, put ice or a cold pack on the area several times a day for 10 minutes at a time. Follow this by putting a warm compress on the area for another 10 to 20 minutes or by sitting in a shallow, warm bath. When should you call for help? Call 911 anytime you think you may need emergency care.  For example, call if:  · You are thinking of hurting yourself, your baby, or anyone else. · You passed out (lost consciousness). · You have symptoms of a blood clot in your lung (called a pulmonary embolism). These may include:    · Sudden chest pain. · Trouble breathing. · Coughing up blood. Call your doctor now or seek immediate medical care if:  · You have severe vaginal bleeding. · You are soaking through a pad each hour for 2 or more hours. · Your vaginal bleeding seems to be getting heavier or is still bright red 4 days after delivery. · You are dizzy or lightheaded, or you feel like you may faint. · You are vomiting or cannot keep fluids down. · You have a fever. · You have new or more belly pain. · You pass tissue (not just blood). · Your vaginal discharge smells bad. · Your belly feels tender or full and hard. · Your breasts are continuously painful or red. · You feel sad, anxious, or hopeless for more than a few days. · You have sudden, severe pain in your belly. · You have symptoms of a blood clot in your leg (called a deep vein thrombosis),          such as:  · Pain in your calf, back of the knee, thigh, or groin. · Redness and swelling in your leg or groin. · You have symptoms of preeclampsia, such as:  · Sudden swelling of your face, hands, or feet. · New vision problems (such as dimness or blurring). · A severe headache. · Your blood pressure is higher than it should be or rises suddenly. · You have new nausea or vomiting. Watch closely for changes in your health, and be sure to contact your doctor if you have any problems.

## 2019-01-17 NOTE — LACTATION NOTE
This note was copied from a baby's chart. Mom says baby has been latching and nursing well. I gave mom some tips on positioning and helped her get the baby latched deeply in the cross cradle hold. Baby began sucking rhythmically. Mom will not limit the time the baby is at the breast. She will allow baby to completely finish one breast and then offer the second breast at each feeding.

## 2019-01-17 NOTE — PROGRESS NOTES
Post-Partum Day Number 2 Progress Note [address] Assessment:  
Hospital Problems  Date Reviewed: 7/10/2017 Codes Class Noted POA Pregnancy ICD-10-CM: Z34.90 ICD-9-CM: V22.2  2019 Unknown Doing well, post partum day 2 
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 Mildly elevated BPs- normalized Plan: 1. Discharge home today 2. Follow up in office in 6 weeks with Elaina Quintero,*  
3. Post partum activity advised, diet as tolerated 4. Discharge Medications: ibuprofen,  and medications prior to admission Information for the patient's :  Joel Late [[id number]] Vaginal, Spontaneous Patient doing well without significant complaint. Voiding without difficulty, normal lochia. Current Facility-Administered Medications Medication Dose Route Frequency  fentaNYL 2mcg/mL - bupivacaine 0.125% pf epidural  1-16 mL/hr Epidural CONTINUOUS  
 measles, mumps & rubella Vacc (PF) (M-M-R II) injection 0.5 mL  0.5 mL SubCUTAneous PRIOR TO DISCHARGE  aspirin chewable tablet 81 mg  81 mg Oral DAILY  sodium chloride (NS) flush 5-40 mL  5-40 mL IntraVENous Q8H  
 ibuprofen (MOTRIN) tablet 800 mg  800 mg Oral Q8H  
 diph,Pertuss(AC),Tet Vac-PF (BOOSTRIX) suspension 0.5 mL  0.5 mL IntraMUSCular PRIOR TO DISCHARGE  lactated Ringers infusion  125 mL/hr IntraVENous CONTINUOUS Vitals: 
Visit Vitals /74 (BP 1 Location: Left arm, BP Patient Position: At rest;Lying right side) Pulse 76 Temp 98 °F (36.7 °C) Resp 16 Ht 5' 4\" (1.626 m) Wt 91.2 kg (201 lb) Comment: 51 pound gain SpO2 95% Breastfeeding? Unknown BMI 34.50 kg/m² Temp (24hrs), Av.8 °F (36.6 °C), Min:97.4 °F (36.3 °C), Max:98 °F (36.7 °C) Exam:    
    Patient without distress. Abdomen soft, fundus firm, nontender Lower extremities are negative for swelling, cords or tenderness. Labs:  
 
Lab Results Component Value Date/Time WBC 16.0 (H) 01/15/2019 07:59 PM  
 WBC 10.7 01/13/2019 07:21 PM  
 WBC 11.6 (H) 12/16/2018 02:45 AM  
 HGB 11.9 01/15/2019 07:59 PM  
 HGB 11.3 (L) 01/13/2019 07:21 PM  
 HGB 10.8 (L) 12/16/2018 02:45 AM  
 HCT 37.5 01/15/2019 07:59 PM  
 HCT 36.4 01/13/2019 07:21 PM  
 HCT 35.6 12/16/2018 02:45 AM  
 PLATELET 596 83/91/3407 07:59 PM  
 PLATELET 874 94/95/8422 07:21 PM  
 PLATELET 076 12/40/4039 02:45 AM  
 
 
No results found for this or any previous visit (from the past 24 hour(s)).

## 2019-01-17 NOTE — ROUTINE PROCESS
Bedside shift change report given to Romeo Aponte RN (oncoming nurse) by Noel Hines RN (offgoing nurse). Report included the following information SBAR, Kardex, Intake/Output, MAR, Accordion and Recent Results.

## 2019-01-17 NOTE — DISCHARGE SUMMARY
Obstetrical Discharge Summary     Name: Elke Stroud MRN: [id number]  SSN: [id number]    YOB: 1985  Age: 34 y.o. Sex: female      Admit Date: 2019    Discharge Date: 2019    Admitting Physician: Milton Diop CNM     Attending Physician:  Jaskaran Avila,*     Discharge Diagnoses:   Information for the patient's :  Vikas Michael [[id number]]   Delivery of a 3.39 kg female infant via Vaginal, Spontaneous on 1/15/2019 at 5:30 PM  by . Apgars were 4 and 8. Additional Diagnoses:   Problem List as of 2019 Date Reviewed: 7/10/2017          Codes Class Noted - Resolved    Pregnancy ICD-10-CM: Z34.90  ICD-9-CM: V22.2  2019 - Present        Abdominal pain affecting pregnancy, antepartum ICD-10-CM: O26.899, R10.9  ICD-9-CM: 646.83, 789.00  2018 - Present        Lymphadenopathy, axillary right ICD-10-CM: R59.0  ICD-9-CM: 785.6  7/10/2017 - Present        [facility]iolipin antibody syndrome ([facility] ICD-10-CM: D68.61  ICD-9-CM: 289.81  9/15/2015 - Present              Lab Results   Component Value Date/Time    Rubella, External immune 2018    GrBStrep, External Negative 2018     Recent Labs     01/15/19  1959   HGB 11.9       Hospital Course: Normal hospital course following the delivery. ppd #2 s/p . 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 Mildly elevated BPs- normalized      Patient Instructions:   Current Discharge Medication List      START taking these medications    Details   aspirin 81 mg chewable tablet Take 1 Tab by mouth daily. Qty: 60 Tab, Refills: 1      ibuprofen (MOTRIN) 600 mg tablet Take 1 Tab by mouth every eight (8) hours as needed for Pain. Qty: 36 Tab, Refills: 2         CONTINUE these medications which have NOT CHANGED    Details   Pediatric Multivit Cmb #11-FA (GUMMY DINOS) 200 mcg chew Take 2 Caps by mouth.       ferrous sulfate (SLOW FE) 142 mg (45 mg iron) ER tablet Take 142 mg by mouth Daily (before breakfast). Aspirin, Buffered 81 mg tab Take  by mouth.      triamcinolone acetonide (KENALOG) 0.1 % ointment Apply  to affected area two (2) times a day. use thin layer  Qty: 30 g, Refills: 0    Associated Diagnoses: Dermatitis due to unknown cause         STOP taking these medications       aspirin delayed-release 81 mg tablet Comments:   Reason for Stopping:               Reference my discharge instructions.     Follow-up Appointments   Procedures    FOLLOW UP VISIT Appointment in: 6 Weeks     Standing Status:   Standing     Number of Occurrences:   1     Order Specific Question:   Appointment in     Answer:   6 Weeks        Signed By:  Carson Pierre MD     January 17, 2019                       BST

## 2019-01-17 NOTE — LACTATION NOTE
This note was copied from a baby's chart. Baby nursing well and has improved throughout post partum stay, deep latch maintained, mother is comfortable, milk is in transition, baby feeding vigorously with rhythmic suck, swallow, breathe pattern, with audible swallowing, and evident milk transfer, both breasts offerd, baby is asleep following feeding. Baby is feeding on demand, voiding and stools present as appropriate over the last 24 hours. Mother states that she has no further  questions for Lactation Consultant before discharge. She will follow up with pediatrician tomorrow. Mother has information about the breastfeeding mother's support group.